# Patient Record
Sex: MALE | Race: WHITE | NOT HISPANIC OR LATINO | Employment: FULL TIME | URBAN - METROPOLITAN AREA
[De-identification: names, ages, dates, MRNs, and addresses within clinical notes are randomized per-mention and may not be internally consistent; named-entity substitution may affect disease eponyms.]

---

## 2020-01-23 ENCOUNTER — TRANSFERRED RECORDS (OUTPATIENT)
Dept: HEALTH INFORMATION MANAGEMENT | Facility: CLINIC | Age: 25
End: 2020-01-23

## 2021-10-20 ENCOUNTER — OFFICE VISIT (OUTPATIENT)
Dept: FAMILY MEDICINE | Facility: CLINIC | Age: 26
End: 2021-10-20
Payer: COMMERCIAL

## 2021-10-20 VITALS
DIASTOLIC BLOOD PRESSURE: 78 MMHG | WEIGHT: 173.13 LBS | BODY MASS INDEX: 24.24 KG/M2 | HEIGHT: 71 IN | SYSTOLIC BLOOD PRESSURE: 137 MMHG | HEART RATE: 51 BPM | TEMPERATURE: 97.2 F

## 2021-10-20 DIAGNOSIS — Z23 ENCOUNTER FOR IMMUNIZATION: ICD-10-CM

## 2021-10-20 DIAGNOSIS — Z00.00 ROUTINE GENERAL MEDICAL EXAMINATION AT A HEALTH CARE FACILITY: Primary | ICD-10-CM

## 2021-10-20 DIAGNOSIS — Z23 NEED FOR PROPHYLACTIC VACCINATION AND INOCULATION AGAINST INFLUENZA: ICD-10-CM

## 2021-10-20 PROCEDURE — 99385 PREV VISIT NEW AGE 18-39: CPT | Mod: 25 | Performed by: FAMILY MEDICINE

## 2021-10-20 PROCEDURE — 90715 TDAP VACCINE 7 YRS/> IM: CPT | Performed by: FAMILY MEDICINE

## 2021-10-20 PROCEDURE — 90471 IMMUNIZATION ADMIN: CPT | Performed by: FAMILY MEDICINE

## 2021-10-20 PROCEDURE — 90686 IIV4 VACC NO PRSV 0.5 ML IM: CPT | Performed by: FAMILY MEDICINE

## 2021-10-20 PROCEDURE — 90472 IMMUNIZATION ADMIN EACH ADD: CPT | Performed by: FAMILY MEDICINE

## 2021-10-20 ASSESSMENT — ENCOUNTER SYMPTOMS
CHILLS: 0
FEVER: 0
PALPITATIONS: 0
EYE PAIN: 0
HEMATURIA: 0
NAUSEA: 0
HEARTBURN: 0
JOINT SWELLING: 0
NERVOUS/ANXIOUS: 0
ABDOMINAL PAIN: 0
HEMATOCHEZIA: 0
DIZZINESS: 0
CONSTIPATION: 0
PARESTHESIAS: 0
MYALGIAS: 0
SORE THROAT: 0
SHORTNESS OF BREATH: 0
DYSURIA: 0
WEAKNESS: 0
ARTHRALGIAS: 0
DIARRHEA: 0
HEADACHES: 0
FREQUENCY: 0
COUGH: 0

## 2021-10-20 ASSESSMENT — PAIN SCALES - GENERAL: PAINLEVEL: NO PAIN (0)

## 2021-10-20 ASSESSMENT — MIFFLIN-ST. JEOR: SCORE: 1801.54

## 2021-10-20 NOTE — PROGRESS NOTES
SUBJECTIVE:   CC: Jarrett Lynch is an 26 year old male who presents for preventative health visit.        Patient has been advised of split billing requirements and indicates understanding: Yes  Healthy Habits:     Getting at least 3 servings of Calcium per day:  Yes    Bi-annual eye exam:  Yes    Dental care twice a year:  Yes    Sleep apnea or symptoms of sleep apnea:  None    Diet:  Regular (no restrictions)    Frequency of exercise:  4-5 days/week    Duration of exercise:  45-60 minutes    Taking medications regularly:  Yes    Medication side effects:  Not applicable    PHQ-2 Total Score: 0    Additional concerns today:  No               Today's PHQ-2 Score:   PHQ-2 ( 1999 Pfizer) 10/20/2021   Q1: Little interest or pleasure in doing things 0   Q2: Feeling down, depressed or hopeless 0   PHQ-2 Score 0   Q1: Little interest or pleasure in doing things Not at all   Q2: Feeling down, depressed or hopeless Not at all   PHQ-2 Score 0       Abuse: Current or Past(Physical, Sexual or Emotional)- No  Do you feel safe in your environment? Yes    Have you ever done Advance Care Planning? (For example, a Health Directive, POLST, or a discussion with a medical provider or your loved ones about your wishes): No, advance care planning information given to patient to review.  Patient declined advance care planning discussion at this time.    Social History     Tobacco Use     Smoking status: Never Smoker     Smokeless tobacco: Never Used   Substance Use Topics     Alcohol use: Yes     If you drink alcohol do you typically have >3 drinks per day or >7 drinks per week? Not applicable    Alcohol Use 10/20/2021   Prescreen: >3 drinks/day or >7 drinks/week? No   Prescreen: >3 drinks/day or >7 drinks/week? -   No flowsheet data found.    Last PSA: No results found for: PSA    Reviewed orders with patient. Reviewed health maintenance and updated orders accordingly - Yes       Reviewed and updated as needed this visit by clinical  "staff  Tobacco  Allergies  Meds   Med Hx  Surg Hx  Fam Hx  Soc Hx        Reviewed and updated as needed this visit by Provider                     Review of Systems   Constitutional: Negative for chills and fever.   HENT: Negative for congestion, ear pain, hearing loss and sore throat.    Eyes: Negative for pain and visual disturbance.   Respiratory: Negative for cough and shortness of breath.    Cardiovascular: Negative for chest pain, palpitations and peripheral edema.   Gastrointestinal: Negative for abdominal pain, constipation, diarrhea, heartburn, hematochezia and nausea.   Genitourinary: Negative for discharge, dysuria, frequency, genital sores, hematuria, impotence and urgency.   Musculoskeletal: Negative for arthralgias, joint swelling and myalgias.   Skin: Negative for rash.   Neurological: Negative for dizziness, weakness, headaches and paresthesias.   Psychiatric/Behavioral: Negative for mood changes. The patient is not nervous/anxious.       medtronic     Rice crejerry  No ongoing health problems      Feeling good    Did marathon    1st one     College baseball    No concerns about stds    Wt stable    No joint pains        OBJECTIVE:   /78 (BP Location: Left arm, Patient Position: Chair, Cuff Size: Adult Regular)   Pulse 51   Temp 97.2  F (36.2  C) (Temporal)   Ht 1.81 m (5' 11.26\")   Wt 78.5 kg (173 lb 2 oz)   BMI 23.97 kg/m      Physical Exam  GENERAL: healthy, alert and no distress  EYES: Eyes grossly normal to inspection, PERRL and conjunctivae and sclerae normal  HENT: ear canals and TM's normal, nose and mouth without ulcers or lesions  NECK: no adenopathy, no asymmetry, masses, or scars and thyroid normal to palpation  RESP: lungs clear to auscultation - no rales, rhonchi or wheezes  CV: regular rate and rhythm, normal S1 S2, no S3 or S4, no murmur, click or rub, no peripheral edema and peripheral pulses strong  ABDOMEN: soft, nontender, no hepatosplenomegaly, no masses and " "bowel sounds normal  MS: no gross musculoskeletal defects noted, no edema  SKIN: no suspicious lesions or rashes  NEURO: Normal strength and tone, mentation intact and speech normal  PSYCH: mentation appears normal, affect normal/bright    Diagnostic Test Results:  Labs reviewed in Epic    ASSESSMENT/PLAN:   Jarrett was seen today for physical, health maintenance and imm/inj.    Diagnoses and all orders for this visit:    Routine general medical examination at a health care facility    Encounter for immunization  -     TDAP VACCINE (Adacel, Boostrix)  [[id number]]  -     SCREENING QUESTIONS FOR ADULT IMMUNIZATIONS    Need for prophylactic vaccination and inoculation against influenza  -     INFLUENZA VACCINE IM > 6 MONTHS VALENT IIV4 (AFLURIA/FLUZONE)    Other orders  -     REVIEW OF HEALTH MAINTENANCE PROTOCOL ORDERS    patient in very good health  He will try to get childhood shot record from mom  Gave tdap and flu  Discussed hpv vaccine  No std concern at this time  Can do physical every 2-3 years  Come in sooner if needed     Patient has been advised of split billing requirements and indicates understanding: Yes  COUNSELING:   Reviewed preventive health counseling, as reflected in patient instructions       Regular exercise       Healthy diet/nutrition       Vision screening       Safe sex practices/STD prevention    Estimated body mass index is 23.97 kg/m  as calculated from the following:    Height as of this encounter: 1.81 m (5' 11.26\").    Weight as of this encounter: 78.5 kg (173 lb 2 oz).         He reports that he has never smoked. He has never used smokeless tobacco.      Counseling Resources:  ATP IV Guidelines  Pooled Cohorts Equation Calculator  FRAX Risk Assessment  ICSI Preventive Guidelines  Dietary Guidelines for Americans, 2010  USDA's MyPlate  ASA Prophylaxis  Lung CA Screening    Farhat Rehman MD  [facility]  "

## 2022-01-06 ENCOUNTER — HOSPITAL ENCOUNTER (OUTPATIENT)
Dept: CT IMAGING | Facility: CLINIC | Age: 27
Discharge: HOME | End: 2022-01-06
Attending: PHYSICIAN ASSISTANT | Admitting: PHYSICIAN ASSISTANT
Payer: COMMERCIAL

## 2022-01-06 ENCOUNTER — OFFICE VISIT (OUTPATIENT)
Dept: PEDIATRICS | Facility: CLINIC | Age: 27
End: 2022-01-06

## 2022-01-06 ENCOUNTER — OFFICE VISIT (OUTPATIENT)
Dept: FAMILY MEDICINE | Facility: CLINIC | Age: 27
End: 2022-01-06

## 2022-01-06 VITALS
BODY MASS INDEX: 24.88 KG/M2 | OXYGEN SATURATION: 98 % | WEIGHT: 179.7 LBS | SYSTOLIC BLOOD PRESSURE: 157 MMHG | DIASTOLIC BLOOD PRESSURE: 75 MMHG | HEART RATE: 49 BPM

## 2022-01-06 VITALS
RESPIRATION RATE: 18 BRPM | DIASTOLIC BLOOD PRESSURE: 75 MMHG | OXYGEN SATURATION: 100 % | TEMPERATURE: 97.4 F | BODY MASS INDEX: 24.78 KG/M2 | WEIGHT: 179 LBS | SYSTOLIC BLOOD PRESSURE: 150 MMHG | HEART RATE: 56 BPM

## 2022-01-06 DIAGNOSIS — R19.00 ABDOMINAL MASS, UNSPECIFIED ABDOMINAL LOCATION: ICD-10-CM

## 2022-01-06 DIAGNOSIS — R19.00 ABDOMINAL MASS, UNSPECIFIED ABDOMINAL LOCATION: Primary | ICD-10-CM

## 2022-01-06 DIAGNOSIS — R74.8 ELEVATED LIVER ENZYMES: ICD-10-CM

## 2022-01-06 DIAGNOSIS — R10.31 ABDOMINAL PAIN, RIGHT LOWER QUADRANT: ICD-10-CM

## 2022-01-06 DIAGNOSIS — R10.31 ABDOMINAL PAIN, RIGHT LOWER QUADRANT: Primary | ICD-10-CM

## 2022-01-06 LAB
ALBUMIN SERPL-MCNC: 4.2 G/DL (ref 3.4–5)
ALBUMIN UR-MCNC: 20 MG/DL
ALP SERPL-CCNC: 61 U/L (ref 40–150)
ALT SERPL W P-5'-P-CCNC: 95 U/L (ref 0–70)
ANION GAP SERPL CALCULATED.3IONS-SCNC: 4 MMOL/L (ref 3–14)
APPEARANCE UR: CLEAR
AST SERPL W P-5'-P-CCNC: 660 U/L (ref 0–45)
BASOPHILS # BLD AUTO: 0.1 10E3/UL (ref 0–0.2)
BASOPHILS NFR BLD AUTO: 1 %
BILIRUB SERPL-MCNC: 0.8 MG/DL (ref 0.2–1.3)
BILIRUB UR QL STRIP: NEGATIVE
BUN SERPL-MCNC: 14 MG/DL (ref 7–30)
CALCIUM SERPL-MCNC: 9 MG/DL (ref 8.5–10.1)
CHLORIDE BLD-SCNC: 107 MMOL/L (ref 94–109)
CO2 SERPL-SCNC: 28 MMOL/L (ref 20–32)
COLOR UR AUTO: ABNORMAL
CREAT SERPL-MCNC: 0.78 MG/DL (ref 0.66–1.25)
EOSINOPHIL # BLD AUTO: 0.1 10E3/UL (ref 0–0.7)
EOSINOPHIL NFR BLD AUTO: 2 %
ERYTHROCYTE [DISTWIDTH] IN BLOOD BY AUTOMATED COUNT: 12.6 % (ref 10–15)
GFR SERPL CREATININE-BSD FRML MDRD: >90 ML/MIN/1.73M2
GLUCOSE BLD-MCNC: 92 MG/DL (ref 70–99)
GLUCOSE UR STRIP-MCNC: NEGATIVE MG/DL
HCT VFR BLD AUTO: 46.4 % (ref 40–53)
HGB BLD-MCNC: 15.4 G/DL (ref 13.3–17.7)
HGB UR QL STRIP: ABNORMAL
IMM GRANULOCYTES # BLD: 0 10E3/UL
IMM GRANULOCYTES NFR BLD: 0 %
KETONES UR STRIP-MCNC: NEGATIVE MG/DL
LEUKOCYTE ESTERASE UR QL STRIP: NEGATIVE
LYMPHOCYTES # BLD AUTO: 1.4 10E3/UL (ref 0.8–5.3)
LYMPHOCYTES NFR BLD AUTO: 17 %
MCH RBC QN AUTO: 31.3 PG (ref 26.5–33)
MCHC RBC AUTO-ENTMCNC: 33.2 G/DL (ref 31.5–36.5)
MCV RBC AUTO: 94 FL (ref 78–100)
MONOCYTES # BLD AUTO: 0.6 10E3/UL (ref 0–1.3)
MONOCYTES NFR BLD AUTO: 7 %
NEUTROPHILS # BLD AUTO: 5.8 10E3/UL (ref 1.6–8.3)
NEUTROPHILS NFR BLD AUTO: 73 %
NITRATE UR QL: NEGATIVE
NRBC # BLD AUTO: 0 10E3/UL
NRBC BLD AUTO-RTO: 0 /100
PH UR STRIP: 6 [PH] (ref 5–7)
PLATELET # BLD AUTO: 275 10E3/UL (ref 150–450)
POTASSIUM BLD-SCNC: 4.1 MMOL/L (ref 3.4–5.3)
PROT SERPL-MCNC: 7.6 G/DL (ref 6.8–8.8)
RBC # BLD AUTO: 4.92 10E6/UL (ref 4.4–5.9)
RBC URINE: 1 /HPF
SODIUM SERPL-SCNC: 139 MMOL/L (ref 133–144)
SP GR UR STRIP: 1.01 (ref 1–1.03)
UROBILINOGEN UR STRIP-MCNC: NORMAL MG/DL
WBC # BLD AUTO: 8 10E3/UL (ref 4–11)
WBC URINE: <1 /HPF

## 2022-01-06 PROCEDURE — 36415 COLL VENOUS BLD VENIPUNCTURE: CPT | Performed by: PHYSICIAN ASSISTANT

## 2022-01-06 PROCEDURE — 250N000011 HC RX IP 250 OP 636: Performed by: PHYSICIAN ASSISTANT

## 2022-01-06 PROCEDURE — 99215 OFFICE O/P EST HI 40 MIN: CPT | Performed by: PHYSICIAN ASSISTANT

## 2022-01-06 PROCEDURE — 99207 REFERRAL TO ACUTE AND DIAGNOSTIC SERVICES: CPT

## 2022-01-06 PROCEDURE — 85025 COMPLETE CBC W/AUTO DIFF WBC: CPT | Performed by: PHYSICIAN ASSISTANT

## 2022-01-06 PROCEDURE — 250N000009 HC RX 250: Performed by: PHYSICIAN ASSISTANT

## 2022-01-06 PROCEDURE — 81001 URINALYSIS AUTO W/SCOPE: CPT | Performed by: PHYSICIAN ASSISTANT

## 2022-01-06 PROCEDURE — 99417 PROLNG OP E/M EACH 15 MIN: CPT | Performed by: PHYSICIAN ASSISTANT

## 2022-01-06 PROCEDURE — 74177 CT ABD & PELVIS W/CONTRAST: CPT

## 2022-01-06 PROCEDURE — 80053 COMPREHEN METABOLIC PANEL: CPT | Performed by: PHYSICIAN ASSISTANT

## 2022-01-06 RX ORDER — OMEPRAZOLE 40 MG/1
40 CAPSULE, DELAYED RELEASE ORAL DAILY
Qty: 30 CAPSULE | Refills: 0 | Status: SHIPPED | OUTPATIENT
Start: 2022-01-06 | End: 2022-09-16

## 2022-01-06 RX ORDER — IOPAMIDOL 755 MG/ML
500 INJECTION, SOLUTION INTRAVASCULAR ONCE
Status: COMPLETED | OUTPATIENT
Start: 2022-01-06 | End: 2022-01-06

## 2022-01-06 RX ADMIN — SODIUM CHLORIDE 62 ML: 9 INJECTION, SOLUTION INTRAVENOUS at 14:43

## 2022-01-06 RX ADMIN — IOPAMIDOL 90 ML: 755 INJECTION, SOLUTION INTRAVENOUS at 14:43

## 2022-01-06 NOTE — PATIENT INSTRUCTIONS
Go straight to the [facility]. Do not eat or drink anything for now.   You will be seeing Mason at the [facility] for rule out hernia

## 2022-01-06 NOTE — PROGRESS NOTES
Assessment & Plan     Abdominal mass, unspecified abdominal location  Was referred to the [facility] in [place] for further evaluation for possible hernia.  Patient accepted by the [facility] rule out incarcerated hernia    - Referral to Acute and Diagnostic Services (Day of diagnostic / First order acute); Future    15 minutes spent on the date of the encounter doing chart review, patient visit and documentation        See Patient Instructions    Return for Follow up at the [facility]  now.    [facility]  [facility]    Subjective   Jarrett is a 26 year old who presents for the following health issues     HPI     Jarrett is a 26-year-old male who presents to clinic today with complaints of lower abdominal pain and a small mass on the right side of his lower abdomen after a strenuous abdominal workout 3 days ago.  States he expected some soreness after doing this workout, however each day pain is intensified and swelling in the right side of his lower abdomen has become larger.  Denies any history of hernias.  Has not noticed any swelling in the groin.  No problems passing his urine.  Did have some issues passing bowel movement today and feels bloated.  No nausea or vomiting.  Has not tried anything for his discomfort.      Review of Systems   CONSTITUTIONAL:NEGATIVE for fever, chills, change in weight  RESP:NEGATIVE for significant cough or SOB  CV: NEGATIVE for chest pain, palpitations or peripheral edema  GI: POSITIVE for abdominal pain periumbilical and gas or bloating and NEGATIVE for diarrhea, hematochezia, nausea and vomiting  : negative for dysuria, hematuria, decreased urinary stream.      Objective    BP (!) 157/75 (BP Location: Right arm, Patient Position: Sitting, Cuff Size: Adult Regular)   Pulse (!) 49   Wt 81.5 kg (179 lb 11.2 oz)   SpO2 98%   BMI 24.88 kg/m    Body mass index is 24.88 kg/m .  Physical Exam  Constitutional:        Appearance: Normal appearance.   Cardiovascular:      Rate and Rhythm: Normal rate and regular rhythm.      Heart sounds: Normal heart sounds. No murmur heard.      Pulmonary:      Effort: Pulmonary effort is normal. No respiratory distress.      Breath sounds: Normal breath sounds.   Abdominal:      General: Bowel sounds are normal.      Palpations: There is mass.      Tenderness: There is abdominal tenderness. There is no right CVA tenderness, left CVA tenderness, guarding or rebound.      Hernia: There is no hernia in the right femoral area or right inguinal area.          Comments: Firm mass with tenderness tenderness to palpation.    Skin:     General: Skin is warm and dry.   Neurological:      Mental Status: He is alert and oriented to person, place, and time.   Psychiatric:         Thought Content: Thought content normal.

## 2022-01-06 NOTE — PROGRESS NOTES
Assessment & Plan     Abdominal discomfort, unspecified abdominal location  CBC neg for elevated WBC  UA normal  Start omeprazole  Avoid tylenol and alchohol  - IV access  - sodium chloride (PF) 0.9% PF flush 3 mL  - Referral to Acute and Diagnostic Services (Day of diagnostic / First order acute)  - CBC with platelets and differential; Future  - Comprehensive metabolic panel (BMP + Alb, Alk Phos, ALT, AST, Total. Bili, TP)  - UA with Microscopic reflex to Culture  - CBC with platelets and differential  - omeprazole (PRILOSEC) 40 MG DR capsule; Take 1 capsule (40 mg) by mouth daily    Abdominal pain, right lower quadrant  CT shows no source of infection  CBC neg for infection  CMP shows elevated AST  - IV access  - sodium chloride (PF) 0.9% PF flush 3 mL  - CBC with platelets and differential; Future  - Comprehensive metabolic panel (BMP + Alb, Alk Phos, ALT, AST, Total. Bili, TP)  - UA with Microscopic reflex to Culture  - CBC with platelets and differential  - omeprazole (PRILOSEC) 40 MG DR capsule; Take 1 capsule (40 mg) by mouth daily    Elevated liver enzymes  Results for orders placed or performed during the hospital encounter of 01/06/22   CT Abdomen Pelvis w Contrast     Status: None    Narrative    CT ABDOMEN AND PELVIS WITH CONTRAST 1/6/2022 2:56 PM    CLINICAL HISTORY: Right lower quadrant pain.    TECHNIQUE: CT scan of the abdomen and pelvis was performed following  injection of IV contrast. Multiplanar reformats were obtained. Dose  reduction techniques were used.  CONTRAST: 90 mL Isovue-370    COMPARISON: None.    FINDINGS:   LOWER CHEST: The visualized lung bases are clear.    HEPATOBILIARY: Unremarkable. No hepatic masses are seen.    PANCREAS: Normal.    SPLEEN: Normal.    ADRENAL GLANDS: Normal.    KIDNEYS/BLADDER: Unremarkable. No hydronephrosis.    BOWEL: Evaluation of the bowel is limited by paucity of  intra-abdominal fat. No bowel obstruction. No convincing evidence for  colitis or  diverticulitis. The appendix is not visualized; however,  there is no significant inflammatory change in the region of the  appendix to suggest appendicitis.    PELVIC ORGANS: Small amount of nonspecific free fluid in the pelvis.    LYMPH NODES: No enlarged lymph nodes are identified in the abdomen or  pelvis.    VASCULATURE: Unremarkable.    ADDITIONAL FINDINGS: None.    MUSCULOSKELETAL: Unremarkable.      Impression    IMPRESSION:   1.  No acute abnormality in the abdomen or pelvis. No cause for right  lower quadrant pain is identified.  2.  Small amount of nonspecific free fluid in the pelvis.    ANTHONY JACQUES MD         SYSTEM ID:  [id number]   Results for orders placed or performed in visit on 01/06/22   Comprehensive metabolic panel (BMP + Alb, Alk Phos, ALT, AST, Total. Bili, TP)     Status: Abnormal   Result Value Ref Range    Sodium 139 133 - 144 mmol/L    Potassium 4.1 3.4 - 5.3 mmol/L    Chloride 107 94 - 109 mmol/L    Carbon Dioxide (CO2) 28 20 - 32 mmol/L    Anion Gap 4 3 - 14 mmol/L    Urea Nitrogen 14 7 - 30 mg/dL    Creatinine 0.78 0.66 - 1.25 mg/dL    Calcium 9.0 8.5 - 10.1 mg/dL    Glucose 92 70 - 99 mg/dL    Alkaline Phosphatase 61 40 - 150 U/L     (HH) 0 - 45 U/L    ALT 95 (H) 0 - 70 U/L    Protein Total 7.6 6.8 - 8.8 g/dL    Albumin 4.2 3.4 - 5.0 g/dL    Bilirubin Total 0.8 0.2 - 1.3 mg/dL    GFR Estimate >90 >60 mL/min/1.73m2   UA with Microscopic reflex to Culture     Status: Abnormal    Specimen: Urine, Clean Catch   Result Value Ref Range    Color Urine Straw Colorless, Straw, Light Yellow, Yellow    Appearance Urine Clear Clear    Glucose Urine Negative Negative mg/dL    Bilirubin Urine Negative Negative    Ketones Urine Negative Negative mg/dL    Specific Gravity Urine 1.012 1.003 - 1.035    Blood Urine Large (A) Negative    pH Urine 6.0 5.0 - 7.0    Protein Albumin Urine 20  (A) Negative mg/dL    Urobilinogen Urine Normal Normal, 2.0 mg/dL    Nitrite Urine Negative Negative     Leukocyte Esterase Urine Negative Negative    RBC Urine 1 <=2 /HPF    WBC Urine <1 <=5 /HPF    Narrative    Urine Culture not indicated   CBC with platelets and differential     Status: None   Result Value Ref Range    WBC Count 8.0 4.0 - 11.0 10e3/uL    RBC Count 4.92 4.40 - 5.90 10e6/uL    Hemoglobin 15.4 13.3 - 17.7 g/dL    Hematocrit 46.4 40.0 - 53.0 %    MCV 94 78 - 100 fL    MCH 31.3 26.5 - 33.0 pg    MCHC 33.2 31.5 - 36.5 g/dL    RDW 12.6 10.0 - 15.0 %    Platelet Count 275 150 - 450 10e3/uL    % Neutrophils 73 %    % Lymphocytes 17 %    % Monocytes 7 %    % Eosinophils 2 %    % Basophils 1 %    % Immature Granulocytes 0 %    NRBCs per 100 WBC 0 <1 /100    Absolute Neutrophils 5.8 1.6 - 8.3 10e3/uL    Absolute Lymphocytes 1.4 0.8 - 5.3 10e3/uL    Absolute Monocytes 0.6 0.0 - 1.3 10e3/uL    Absolute Eosinophils 0.1 0.0 - 0.7 10e3/uL    Absolute Basophils 0.1 0.0 - 0.2 10e3/uL    Absolute Immature Granulocytes 0.0 <=0.4 10e3/uL    Absolute NRBCs 0.0 10e3/uL   CBC with platelets and differential     Status: None    Narrative    The following orders were created for panel order CBC with platelets and differential.  Procedure                               Abnormality         Status                     ---------                               -----------         ------                     CBC with platelets and d...[[id number]]                      Final result                 Please view results for these tests on the individual orders.     Avoid ETOH and tylenol  [place] diet  Reduce working out, avoid supplements  Recheck liver enzymes in 1 week with PCP  Go to the ED if symptoms worsen       Review of external notes as documented elsewhere in note  90 minutes spent on the date of the encounter doing chart review, review of outside records, review of test results, interpretation of tests, patient visit and documentation        CONSULTATION/REFERRAL to PCP for recheck of liver rosaline    No follow-ups on  file.    EMMANUEL Luna [facility] MALLORY Farias is a 26 year old who presents for the following health issues   HPI     Abdominal/Flank Pain  Onset/Duration: X 2 days  Description:   Character: Sharp and Burning  Location: right lower quadrant  Radiation: None  Intensity: 6/10  Progression of Symptoms:  worsening  Accompanying Signs & Symptoms:  Fever/Chills: no   Gas/Bloating: YES- bloating  Nausea: no   Vomitting: no   Diarrhea: YES- minor this AM  Constipation: YES  Dysuria or Hematuria: no   History:   Trauma: no   Previous similar pain: no   Previous tests done: no   Previous Abdominal Surgery: no   Precipitating factors:   Does the pain change with:     Food: no     Bowel Movement: no     Urination: no    Other factors:  YES- sitting long periods and then standing, pain is increased vs. Sitting.  Therapies tried and outcome: None        Review of Systems   Constitutional, HEENT, cardiovascular, pulmonary, gi and gu systems are negative, except as otherwise noted.      Objective    Wt 81.2 kg (179 lb)   BMI 24.78 kg/m    Body mass index is 24.78 kg/m .  Physical Exam   GENERAL: healthy, alert and no distress  HENT: ear canals and TM's normal, nose and mouth without ulcers or lesions  NECK: no adenopathy, no asymmetry, masses, or scars and thyroid normal to palpation  RESP: lungs clear to auscultation - no rales, rhonchi or wheezes  CV: regular rate and rhythm, normal S1 S2, no S3 or S4, no murmur, click or rub, no peripheral edema and peripheral pulses strong  ABDOMEN: Positive for mild tenderness right side abdomen, no guarding or rebound.  Negative for RUQ abdominal   MS: no gross musculoskeletal defects noted, no edema  SKIN: no suspicious lesions or rashes  NEURO: Normal strength and tone, mentation intact and speech normal    Results for orders placed or performed during the hospital encounter of 01/06/22   CT Abdomen Pelvis w Contrast     Status: None     Narrative    CT ABDOMEN AND PELVIS WITH CONTRAST 1/6/2022 2:56 PM    CLINICAL HISTORY: Right lower quadrant pain.    TECHNIQUE: CT scan of the abdomen and pelvis was performed following  injection of IV contrast. Multiplanar reformats were obtained. Dose  reduction techniques were used.  CONTRAST: 90 mL Isovue-370    COMPARISON: None.    FINDINGS:   LOWER CHEST: The visualized lung bases are clear.    HEPATOBILIARY: Unremarkable. No hepatic masses are seen.    PANCREAS: Normal.    SPLEEN: Normal.    ADRENAL GLANDS: Normal.    KIDNEYS/BLADDER: Unremarkable. No hydronephrosis.    BOWEL: Evaluation of the bowel is limited by paucity of  intra-abdominal fat. No bowel obstruction. No convincing evidence for  colitis or diverticulitis. The appendix is not visualized; however,  there is no significant inflammatory change in the region of the  appendix to suggest appendicitis.    PELVIC ORGANS: Small amount of nonspecific free fluid in the pelvis.    LYMPH NODES: No enlarged lymph nodes are identified in the abdomen or  pelvis.    VASCULATURE: Unremarkable.    ADDITIONAL FINDINGS: None.    MUSCULOSKELETAL: Unremarkable.      Impression    IMPRESSION:   1.  No acute abnormality in the abdomen or pelvis. No cause for right  lower quadrant pain is identified.  2.  Small amount of nonspecific free fluid in the pelvis.    ANTHONY JACQUES MD         SYSTEM ID:  [id number]   Results for orders placed or performed in visit on 01/06/22   Comprehensive metabolic panel (BMP + Alb, Alk Phos, ALT, AST, Total. Bili, TP)     Status: Abnormal   Result Value Ref Range    Sodium 139 133 - 144 mmol/L    Potassium 4.1 3.4 - 5.3 mmol/L    Chloride 107 94 - 109 mmol/L    Carbon Dioxide (CO2) 28 20 - 32 mmol/L    Anion Gap 4 3 - 14 mmol/L    Urea Nitrogen 14 7 - 30 mg/dL    Creatinine 0.78 0.66 - 1.25 mg/dL    Calcium 9.0 8.5 - 10.1 mg/dL    Glucose 92 70 - 99 mg/dL    Alkaline Phosphatase 61 40 - 150 U/L     (HH) 0 - 45 U/L    ALT 95 (H) 0  - 70 U/L    Protein Total 7.6 6.8 - 8.8 g/dL    Albumin 4.2 3.4 - 5.0 g/dL    Bilirubin Total 0.8 0.2 - 1.3 mg/dL    GFR Estimate >90 >60 mL/min/1.73m2   UA with Microscopic reflex to Culture     Status: Abnormal    Specimen: Urine, Clean Catch   Result Value Ref Range    Color Urine Straw Colorless, Straw, Light Yellow, Yellow    Appearance Urine Clear Clear    Glucose Urine Negative Negative mg/dL    Bilirubin Urine Negative Negative    Ketones Urine Negative Negative mg/dL    Specific Gravity Urine 1.012 1.003 - 1.035    Blood Urine Large (A) Negative    pH Urine 6.0 5.0 - 7.0    Protein Albumin Urine 20  (A) Negative mg/dL    Urobilinogen Urine Normal Normal, 2.0 mg/dL    Nitrite Urine Negative Negative    Leukocyte Esterase Urine Negative Negative    RBC Urine 1 <=2 /HPF    WBC Urine <1 <=5 /HPF    Narrative    Urine Culture not indicated   CBC with platelets and differential     Status: None   Result Value Ref Range    WBC Count 8.0 4.0 - 11.0 10e3/uL    RBC Count 4.92 4.40 - 5.90 10e6/uL    Hemoglobin 15.4 13.3 - 17.7 g/dL    Hematocrit 46.4 40.0 - 53.0 %    MCV 94 78 - 100 fL    MCH 31.3 26.5 - 33.0 pg    MCHC 33.2 31.5 - 36.5 g/dL    RDW 12.6 10.0 - 15.0 %    Platelet Count 275 150 - 450 10e3/uL    % Neutrophils 73 %    % Lymphocytes 17 %    % Monocytes 7 %    % Eosinophils 2 %    % Basophils 1 %    % Immature Granulocytes 0 %    NRBCs per 100 WBC 0 <1 /100    Absolute Neutrophils 5.8 1.6 - 8.3 10e3/uL    Absolute Lymphocytes 1.4 0.8 - 5.3 10e3/uL    Absolute Monocytes 0.6 0.0 - 1.3 10e3/uL    Absolute Eosinophils 0.1 0.0 - 0.7 10e3/uL    Absolute Basophils 0.1 0.0 - 0.2 10e3/uL    Absolute Immature Granulocytes 0.0 <=0.4 10e3/uL    Absolute NRBCs 0.0 10e3/uL   CBC with platelets and differential     Status: None    Narrative    The following orders were created for panel order CBC with platelets and differential.  Procedure                               Abnormality         Status                      ---------                               -----------         ------                     CBC with platelets and d...[[id number]]                      Final result                 Please view results for these tests on the individual orders.

## 2022-01-13 ENCOUNTER — OFFICE VISIT (OUTPATIENT)
Dept: FAMILY MEDICINE | Facility: CLINIC | Age: 27
End: 2022-01-13
Payer: COMMERCIAL

## 2022-01-13 VITALS
HEIGHT: 71 IN | WEIGHT: 181.38 LBS | HEART RATE: 68 BPM | BODY MASS INDEX: 25.39 KG/M2 | SYSTOLIC BLOOD PRESSURE: 123 MMHG | DIASTOLIC BLOOD PRESSURE: 58 MMHG | TEMPERATURE: 98 F

## 2022-01-13 DIAGNOSIS — Z11.59 NEED FOR HEPATITIS C SCREENING TEST: ICD-10-CM

## 2022-01-13 DIAGNOSIS — R79.89 ELEVATED LFTS: Primary | ICD-10-CM

## 2022-01-13 LAB
ALBUMIN SERPL-MCNC: 3.9 G/DL (ref 3.4–5)
ALP SERPL-CCNC: 53 U/L (ref 40–150)
ALT SERPL W P-5'-P-CCNC: 81 U/L (ref 0–70)
AST SERPL W P-5'-P-CCNC: 57 U/L (ref 0–45)
BILIRUB DIRECT SERPL-MCNC: 0.1 MG/DL (ref 0–0.2)
BILIRUB SERPL-MCNC: 0.4 MG/DL (ref 0.2–1.3)
HAV IGG SER QL IA: NONREACTIVE
HBV SURFACE AG SERPL QL IA: NONREACTIVE
HCV AB SERPL QL IA: NONREACTIVE
PROT SERPL-MCNC: 7 G/DL (ref 6.8–8.8)

## 2022-01-13 PROCEDURE — 86803 HEPATITIS C AB TEST: CPT | Performed by: FAMILY MEDICINE

## 2022-01-13 PROCEDURE — 87340 HEPATITIS B SURFACE AG IA: CPT | Performed by: FAMILY MEDICINE

## 2022-01-13 PROCEDURE — 80076 HEPATIC FUNCTION PANEL: CPT | Performed by: FAMILY MEDICINE

## 2022-01-13 PROCEDURE — 99213 OFFICE O/P EST LOW 20 MIN: CPT | Performed by: FAMILY MEDICINE

## 2022-01-13 PROCEDURE — 36415 COLL VENOUS BLD VENIPUNCTURE: CPT | Performed by: FAMILY MEDICINE

## 2022-01-13 PROCEDURE — 86708 HEPATITIS A ANTIBODY: CPT | Performed by: FAMILY MEDICINE

## 2022-01-13 ASSESSMENT — MIFFLIN-ST. JEOR: SCORE: 1838.96

## 2022-01-13 ASSESSMENT — PAIN SCALES - GENERAL: PAINLEVEL: NO PAIN (0)

## 2022-01-13 NOTE — PATIENT INSTRUCTIONS
Avoid etoh for now    Stay well hydrated    Finish out omeprazole    After that, could use famotidine ( pepcid ) as needed     We will send you lab results    Be seen promptly if symptoms acutely worsen

## 2022-01-13 NOTE — PROGRESS NOTES
"Kel Farias is a 26 year old who presents for the following health issues     HPI     ED/UC Followup:    Facility:  [facility]  Date of visit: 01/06/2021  Reason for visit: abdominal pain  Current Status: stable but needs to follow up on elevated liver functions            Review of Systems    [facility] in [place]    Rare etoh    None the week or two prior to visit/ labs     Urine looks normal to patient    Sore from wt lifting    No tylenol    Some vomiting recently    Had neg covid test              Objective    /58 (BP Location: Left arm, Patient Position: Chair, Cuff Size: Adult Regular)   Pulse 68   Temp 98  F (36.7  C) (Temporal)   Ht 1.81 m (5' 11.26\")   Wt 82.3 kg (181 lb 6 oz)   BMI 25.11 kg/m    Body mass index is 25.11 kg/m .  Physical Exam  Constitutional:       Appearance: He is well-developed.   HENT:      Head: Normocephalic and atraumatic.   Eyes:      Conjunctiva/sclera: Conjunctivae normal.   Neck:      Vascular: No carotid bruit.   Cardiovascular:      Rate and Rhythm: Normal rate and regular rhythm.      Heart sounds: Normal heart sounds.   Pulmonary:      Effort: Pulmonary effort is normal. No respiratory distress.      Breath sounds: Normal breath sounds.   Abdominal:      General: There is no distension.      Palpations: Abdomen is soft.      Tenderness: There is no abdominal tenderness. There is no right CVA tenderness or left CVA tenderness.   Neurological:      Mental Status: He is alert and oriented to person, place, and time.      Cranial Nerves: No cranial nerve deficit.   Psychiatric:         Speech: Speech normal.         Behavior: Behavior normal.         ASSESSMENT / PLAN:  (U91.95) Elevated LFTs  (primary encounter diagnosis)  Comment: exam here reassuring.  Do follow up liver function test and also check for hepatitis.   Plan: Hepatic panel (Albumin, ALT, AST, Bili, Alk         Phos, TP), Hepatitis Antibody A IgG, Hepatitis "         B surface antigen             (Z11.59) Need for hepatitis C screening test  Comment: as above  Plan: Hepatitis C Screen Reflex to HCV RNA Quant and         Genotype           Follow up as needed based on symptoms     Be seen promptly if symptoms acutely worsen     I reviewed the patient's medications, allergies, medical history, family history, and social history.    Farhat Rehman MD

## 2022-01-14 DIAGNOSIS — R79.89 ELEVATED LFTS: Primary | ICD-10-CM

## 2022-01-14 NOTE — RESULT ENCOUNTER NOTE
The liver tests are much better.  Almost to normal.  Advise doing one more lab test.  In a bout a month do a lab only appointment to get the liver function tests done one more time.  No alcohol for two weeks prior.    No evidence of hepatitis A, B, or C.    Farhat Rehman MD

## 2022-09-12 ENCOUNTER — HOSPITAL ENCOUNTER (EMERGENCY)
Facility: CLINIC | Age: 27
Discharge: HOME | End: 2022-09-12
Attending: EMERGENCY MEDICINE | Admitting: EMERGENCY MEDICINE
Payer: COMMERCIAL

## 2022-09-12 ENCOUNTER — APPOINTMENT (OUTPATIENT)
Dept: CARDIOLOGY | Facility: CLINIC | Age: 27
End: 2022-09-12
Attending: EMERGENCY MEDICINE
Payer: COMMERCIAL

## 2022-09-12 VITALS
RESPIRATION RATE: 15 BRPM | HEART RATE: 40 BPM | DIASTOLIC BLOOD PRESSURE: 66 MMHG | OXYGEN SATURATION: 100 % | SYSTOLIC BLOOD PRESSURE: 124 MMHG | TEMPERATURE: 97.6 F

## 2022-09-12 DIAGNOSIS — Z83.49 FAMILY HISTORY OF THYROID DISEASE: ICD-10-CM

## 2022-09-12 DIAGNOSIS — R55 PRE-SYNCOPE: ICD-10-CM

## 2022-09-12 DIAGNOSIS — R53.83 OTHER FATIGUE: ICD-10-CM

## 2022-09-12 LAB
ALBUMIN SERPL BCG-MCNC: 4.1 G/DL (ref 3.5–5.2)
ALP SERPL-CCNC: 62 U/L (ref 40–129)
ALT SERPL W P-5'-P-CCNC: 18 U/L (ref 10–50)
ANION GAP SERPL CALCULATED.3IONS-SCNC: 7 MMOL/L (ref 7–15)
AST SERPL W P-5'-P-CCNC: 20 U/L (ref 10–50)
ATRIAL RATE - MUSE: 55 BPM
BASOPHILS # BLD AUTO: 0 10E3/UL (ref 0–0.2)
BASOPHILS NFR BLD AUTO: 1 %
BILIRUB SERPL-MCNC: 0.4 MG/DL
BUN SERPL-MCNC: 21.3 MG/DL (ref 6–20)
CALCIUM SERPL-MCNC: 9.1 MG/DL (ref 8.6–10)
CHLORIDE SERPL-SCNC: 105 MMOL/L (ref 98–107)
CREAT SERPL-MCNC: 0.98 MG/DL (ref 0.67–1.17)
D DIMER PPP FEU-MCNC: <0.27 UG/ML FEU (ref 0–0.5)
DEPRECATED HCO3 PLAS-SCNC: 27 MMOL/L (ref 22–29)
DIASTOLIC BLOOD PRESSURE - MUSE: NORMAL MMHG
EOSINOPHIL # BLD AUTO: 0.1 10E3/UL (ref 0–0.7)
EOSINOPHIL NFR BLD AUTO: 2 %
ERYTHROCYTE [DISTWIDTH] IN BLOOD BY AUTOMATED COUNT: 12.8 % (ref 10–15)
GFR SERPL CREATININE-BSD FRML MDRD: >90 ML/MIN/1.73M2
GLUCOSE SERPL-MCNC: 107 MG/DL (ref 70–99)
HCT VFR BLD AUTO: 39.4 % (ref 40–53)
HGB BLD-MCNC: 13.8 G/DL (ref 13.3–17.7)
HOLD SPECIMEN: NORMAL
IMM GRANULOCYTES # BLD: 0 10E3/UL
IMM GRANULOCYTES NFR BLD: 0 %
INTERPRETATION ECG - MUSE: NORMAL
LYMPHOCYTES # BLD AUTO: 1 10E3/UL (ref 0.8–5.3)
LYMPHOCYTES NFR BLD AUTO: 17 %
MAGNESIUM SERPL-MCNC: 1.9 MG/DL (ref 1.7–2.3)
MCH RBC QN AUTO: 31.6 PG (ref 26.5–33)
MCHC RBC AUTO-ENTMCNC: 35 G/DL (ref 31.5–36.5)
MCV RBC AUTO: 90 FL (ref 78–100)
MONOCYTES # BLD AUTO: 0.4 10E3/UL (ref 0–1.3)
MONOCYTES NFR BLD AUTO: 7 %
NEUTROPHILS # BLD AUTO: 4.4 10E3/UL (ref 1.6–8.3)
NEUTROPHILS NFR BLD AUTO: 73 %
NRBC # BLD AUTO: 0 10E3/UL
NRBC BLD AUTO-RTO: 0 /100
P AXIS - MUSE: 78 DEGREES
PLATELET # BLD AUTO: 210 10E3/UL (ref 150–450)
POTASSIUM SERPL-SCNC: 4.6 MMOL/L (ref 3.4–5.3)
PR INTERVAL - MUSE: 194 MS
PROT SERPL-MCNC: 6.8 G/DL (ref 6.4–8.3)
QRS DURATION - MUSE: 94 MS
QT - MUSE: 456 MS
QTC - MUSE: 436 MS
R AXIS - MUSE: 68 DEGREES
RBC # BLD AUTO: 4.37 10E6/UL (ref 4.4–5.9)
SODIUM SERPL-SCNC: 139 MMOL/L (ref 136–145)
SYSTOLIC BLOOD PRESSURE - MUSE: NORMAL MMHG
T AXIS - MUSE: 15 DEGREES
TROPONIN T SERPL HS-MCNC: <6 NG/L
VENTRICULAR RATE- MUSE: 55 BPM
WBC # BLD AUTO: 6 10E3/UL (ref 4–11)

## 2022-09-12 PROCEDURE — 93244 EXT ECG>48HR<7D REV&INTERPJ: CPT | Performed by: INTERNAL MEDICINE

## 2022-09-12 PROCEDURE — 93005 ELECTROCARDIOGRAM TRACING: CPT | Mod: XU | Performed by: EMERGENCY MEDICINE

## 2022-09-12 PROCEDURE — 80053 COMPREHEN METABOLIC PANEL: CPT | Performed by: EMERGENCY MEDICINE

## 2022-09-12 PROCEDURE — 36415 COLL VENOUS BLD VENIPUNCTURE: CPT | Performed by: EMERGENCY MEDICINE

## 2022-09-12 PROCEDURE — 93242 EXT ECG>48HR<7D RECORDING: CPT

## 2022-09-12 PROCEDURE — 84484 ASSAY OF TROPONIN QUANT: CPT | Performed by: EMERGENCY MEDICINE

## 2022-09-12 PROCEDURE — 85379 FIBRIN DEGRADATION QUANT: CPT | Performed by: EMERGENCY MEDICINE

## 2022-09-12 PROCEDURE — 83735 ASSAY OF MAGNESIUM: CPT | Performed by: EMERGENCY MEDICINE

## 2022-09-12 PROCEDURE — 84443 ASSAY THYROID STIM HORMONE: CPT

## 2022-09-12 PROCEDURE — 99284 EMERGENCY DEPT VISIT MOD MDM: CPT | Performed by: EMERGENCY MEDICINE

## 2022-09-12 PROCEDURE — 84439 ASSAY OF FREE THYROXINE: CPT

## 2022-09-12 PROCEDURE — 93010 ELECTROCARDIOGRAM REPORT: CPT | Performed by: EMERGENCY MEDICINE

## 2022-09-12 PROCEDURE — 99284 EMERGENCY DEPT VISIT MOD MDM: CPT | Mod: 25 | Performed by: EMERGENCY MEDICINE

## 2022-09-12 PROCEDURE — 82040 ASSAY OF SERUM ALBUMIN: CPT | Performed by: EMERGENCY MEDICINE

## 2022-09-12 PROCEDURE — 85018 HEMOGLOBIN: CPT | Performed by: EMERGENCY MEDICINE

## 2022-09-12 ASSESSMENT — ACTIVITIES OF DAILY LIVING (ADL)
ADLS_ACUITY_SCORE: 35

## 2022-09-12 NOTE — ED PROVIDER NOTES
[place] EMERGENCY DEPARTMENT ([facility])  9/12/22  History   No chief complaint on file.    The history is provided by the patient and medical records.     Jarrett Lynch is a generally healthy 27 year old male who presents to the ED via EMS for evaluation of lightheadedness and vision change.  Patient underwent his normal morning routine while at work earlier today suddenly felt like he had to cough but could not.  He then became lightheaded and sat down to avoid falling over.  He notes his vision and blurry and he began sweating.  He states he felt this way for approximately 20 minutes.  EMS was called and recorded a heart rate of 36.  Here in the ED, patient notes feeling less focused but otherwise states his symptoms have mostly resolved.  Patient reports commonly running distances up to 10 miles that his heart rate is typically low, but never as low as 36.  Patient reports running 12 miles on Saturday but denies feeling any different during the run or during his recovery.  Patient notes occasionally feeling lightheaded but states it has never been this intense or this long.  Patient denies recent illnesses.  He denies a history of familial cardiac disease or frequent fainting.  Patient denies long periods of immobility with the exception of driving to and from [place] travel last weekend. No other symptoms noted.      I have reviewed the Medications, Allergies, Past Medical and Surgical History, and Social History in the [employer] system.  PAST MEDICAL HISTORY: History reviewed. No pertinent past medical history.    PAST SURGICAL HISTORY: History reviewed. No pertinent surgical history.    Past medical history, past surgical history, medications, and allergies were reviewed with the patient. Additional pertinent items: None    FAMILY HISTORY: History reviewed. No pertinent family history.    SOCIAL HISTORY:   Social History     Tobacco Use     Smoking status: Never Smoker     Smokeless tobacco:  Never Used   Substance Use Topics     Alcohol use: Yes     Comment: rare     Social history was reviewed with the patient. Additional pertinent items: None      Patient's Medications   New Prescriptions    No medications on file   Previous Medications    OMEPRAZOLE (PRILOSEC) 40 MG DR CAPSULE    Take 1 capsule (40 mg) by mouth daily   Modified Medications    No medications on file   Discontinued Medications    No medications on file        No Known Allergies     Review of Systems     A complete review of systems was performed with pertinent positives and negatives noted in the HPI, and all other systems negative.    Physical Exam   BP: 129/88  Pulse: 71  Temp: 97.6  F (36.4  C)  Resp: 20  SpO2: 100 %      Physical Exam  Vitals and nursing note reviewed.   Constitutional:       General: He is not in acute distress.     Appearance: He is well-developed. He is not diaphoretic.   HENT:      Head: Normocephalic and atraumatic.      Mouth/Throat:      Pharynx: No oropharyngeal exudate.   Eyes:      General: No scleral icterus.        Right eye: No discharge.         Left eye: No discharge.      Pupils: Pupils are equal, round, and reactive to light.   Cardiovascular:      Rate and Rhythm: Normal rate and regular rhythm.      Heart sounds: Normal heart sounds. No murmur heard.    No friction rub. No gallop.   Pulmonary:      Effort: Pulmonary effort is normal. No respiratory distress.      Breath sounds: Normal breath sounds. No wheezing.   Chest:      Chest wall: No tenderness.   Abdominal:      General: Bowel sounds are normal. There is no distension.      Palpations: Abdomen is soft.      Tenderness: There is no abdominal tenderness.   Musculoskeletal:         General: No tenderness or deformity. Normal range of motion.      Cervical back: Normal range of motion and neck supple.   Skin:     General: Skin is warm and dry.      Coloration: Skin is not pale.      Findings: No erythema or rash.   Neurological:      Mental  Status: He is alert and oriented to person, place, and time.      Cranial Nerves: No cranial nerve deficit.         ED Course   10:59 AM  The patient was seen and examined by Dr. Agustín Moore in Room ED 14.        Procedures            EKG Interpretation:      Interpreted by Agustín Moore DO  Time reviewed: 1039  Symptoms at time of EKG: None   Rhythm: sinus bradycardia  Rate: 55  Axis: Normal  Ectopy: none  Conduction: normal  ST Segments/ T Waves: Early repolarization  Q Waves: none  Comparison to prior: No old EKG available    Clinical Impression: sinus bradycardia                    Results for orders placed or performed during the hospital encounter of 09/12/22 (from the past 24 hour(s))   [place] Draw    Narrative    The following orders were created for panel order [place] Draw.  Procedure                               Abnormality         Status                     ---------                               -----------         ------                     Extra Blue Top Tube[[id number]]                              In process                 Extra Red Top Tube[[id number]]                               In process                 Extra Green Top (Lithium...[[id number]]                      In process                 Extra Purple Top Tube[[id number]]                            In process                   Please view results for these tests on the individual orders.   CBC with platelets differential    Narrative    The following orders were created for panel order CBC with platelets differential.  Procedure                               Abnormality         Status                     ---------                               -----------         ------                     CBC with platelets and d...[[id number]]  Abnormal            Final result                 Please view results for these tests on the individual orders.   CBC with platelets and differential   Result Value Ref Range    WBC Count 6.0 4.0 - 11.0 10e3/uL     RBC Count 4.37 (L) 4.40 - 5.90 10e6/uL    Hemoglobin 13.8 13.3 - 17.7 g/dL    Hematocrit 39.4 (L) 40.0 - 53.0 %    MCV 90 78 - 100 fL    MCH 31.6 26.5 - 33.0 pg    MCHC 35.0 31.5 - 36.5 g/dL    RDW 12.8 10.0 - 15.0 %    Platelet Count 210 150 - 450 10e3/uL    % Neutrophils 73 %    % Lymphocytes 17 %    % Monocytes 7 %    % Eosinophils 2 %    % Basophils 1 %    % Immature Granulocytes 0 %    NRBCs per 100 WBC 0 <1 /100    Absolute Neutrophils 4.4 1.6 - 8.3 10e3/uL    Absolute Lymphocytes 1.0 0.8 - 5.3 10e3/uL    Absolute Monocytes 0.4 0.0 - 1.3 10e3/uL    Absolute Eosinophils 0.1 0.0 - 0.7 10e3/uL    Absolute Basophils 0.0 0.0 - 0.2 10e3/uL    Absolute Immature Granulocytes 0.0 <=0.4 10e3/uL    Absolute NRBCs 0.0 10e3/uL   EKG 12-lead, tracing only   Result Value Ref Range    Systolic Blood Pressure  mmHg    Diastolic Blood Pressure  mmHg    Ventricular Rate 55 BPM    Atrial Rate 55 BPM    MD Interval 194 ms    QRS Duration 94 ms     ms    QTc 436 ms    P Axis 78 degrees    R AXIS 68 degrees    T Axis 15 degrees    Interpretation ECG       Sinus bradycardia  ST elevation, consider early repolarization  Borderline ECG       Medications - No data to display          Assessments & Plan (with Medical Decision Making)   This is a 27-year-old male who presents with presyncope.  Patient is also noted to be bradycardic.  Symptoms last approximately 20 minutes.  Exam demonstrates no acute abnormalities.  ECG shows sinus bradycardia with a rate of 55, no other acute abnormalities.  Lab work shows no acute abnormalities.  Troponin and D-dimer not elevated.  Patient was monitored in the Emergency Department and states he felt normal.  He was noted to be bradycardic but states that this is baseline for him, he wears a fitness tracking watch.  Bradycardia is likely due to patient being active and athletic.  We will place a Zio patch on the patient.  Discussed that if he has a recurrence of symptoms to return to the Emergency  Department immediately. Will discharge home with return precautions. Discussed reasons to return to the emergency department.  Patient understands and agrees with this plan.    I have reviewed the nursing notes.    I have reviewed the findings, diagnosis, plan and need for follow up with the patient.    New Prescriptions    No medications on file       Final diagnoses:   None     IHollis, am serving as a trained medical scribe to document services personally performed by Agustín Moore DO, based on the provider's statements to me.      Agustín CASTREJON DO, was physically present and have reviewed and verified the accuracy of this note documented by Hollis Rehman.     9/12/2022   [facility] EMERGENCY DEPARTMENT     Agustín Moore DO  09/12/22 1254     yes

## 2022-09-12 NOTE — ED TRIAGE NOTES
Pt BIBA from work after a near syncopal event. Pt states normal activity at work and suddenly felt a feeling like he needed to cough but couldn't, vision became blurry, diaphoretic, felt faint and had to sit down.  HR 30-40 upon EMS arrival    Atropine 1mg and ASA given en route    No PMH         Age appropriate behavior

## 2022-09-12 NOTE — ED NOTES
Bed: ED14  Expected date: 9/12/22  Expected time:   Means of arrival:   Comments:  Esme 628- 27 yr old syncope

## 2022-09-16 ENCOUNTER — VIRTUAL VISIT (OUTPATIENT)
Dept: FAMILY MEDICINE | Facility: CLINIC | Age: 27
End: 2022-09-16
Payer: COMMERCIAL

## 2022-09-16 DIAGNOSIS — R00.1 SINUS BRADYCARDIA: Primary | ICD-10-CM

## 2022-09-16 DIAGNOSIS — Z83.49 FAMILY HISTORY OF THYROID DISEASE: ICD-10-CM

## 2022-09-16 DIAGNOSIS — R53.83 OTHER FATIGUE: ICD-10-CM

## 2022-09-16 LAB
T4 FREE SERPL-MCNC: 1.25 NG/DL (ref 0.9–1.7)
TSH SERPL DL<=0.005 MIU/L-ACNC: 3.61 UIU/ML (ref 0.3–4.2)

## 2022-09-16 PROCEDURE — 99213 OFFICE O/P EST LOW 20 MIN: CPT | Mod: 95 | Performed by: PHYSICIAN ASSISTANT

## 2022-09-16 NOTE — PROGRESS NOTES
"Jarrett is a 27 year old who is being evaluated via a billable video visit.      How would you like to obtain your AVS? MyChart  If the video visit is dropped, the invitation should be resent by: Text to cell phone: [phone]  Will anyone else be joining your video visit? No          Assessment & Plan     Sinus bradycardia  Discussed low heart rate and his current symptoms. Because this has been symptomatic for him, I would like him to see cardiology for discussion. Will await ZIo Patch results. Discussed making sure he stays hydrated, increase electrolyte consumption - especially if returning to longer runs.   - Adult Cardiology Eval  Referral; Future    Other fatigue  Family history of thyroid disease  Rule out thyroid issues.   - TSH; Future  - T4, free; Future               BMI:   Estimated body mass index is 25.11 kg/m  as calculated from the following:    Height as of 1/13/22: 1.81 m (5' 11.26\").    Weight as of 1/13/22: 82.3 kg (181 lb 6 oz).           Return in about 4 weeks (around 10/14/2022) for worsening symptoms or failure to improve..    Marina Judge PA-C  [facility]    Kle Farias is a 27 year old, presenting for the following health issues:  Hospital F/U      History of Present Illness       Reason for visit:  Follow-up from ER visit on 9/12/2022. No conclusive findings from lab results performed for why I had nearly fainted/very low heart rate suddenly. Looking to confirm if it's safe to drive and if any of the lab results rule out a thyroid issue.    He eats 4 or more servings of fruits and vegetables daily.He consumes 0 sweetened beverage(s) daily.He exercises with enough effort to increase his heart rate 30 to 60 minutes per day.  He exercises with enough effort to increase his heart rate 5 days per week.   He is taking medications regularly.       Patient doing well after ED visit on 9/12/2022 to evaluate a pre-syncopal event while at work. He " does report over the last few months he has had intermittent episodes of lightheadedness. This week, when having lightheadedness, his heart rate has been in the 30s. On average - per his [employer] watch - his resting heart rate tends to be in the upper 40s. He has not had a syncopal event.     He is a runner - runs multiple times per week, generally long runs. Since the ED visit, he ran about 3 miles one day without issue. Then proceeded to do a longer run a day or so later, again without issues.   He does not ever have episodes of lightheadedness while running - but sometimes has had some lightheadedness when cooling down as his heart rate decreases.   Reports his dad has thyroid disease. Wonders if his thyroid could contribute. Reports his hands seem cold often - but thinks this may be due to low heart rate.    He is currently wearing the Zio Patch.        Review of Systems   Constitutional, HEENT, cardiovascular, pulmonary, gi and gu systems are negative, except as otherwise noted.      Objective           Vitals:  No vitals were obtained today due to virtual visit.    Physical Exam   GENERAL: Healthy, alert and no distress  EYES: Eyes grossly normal to inspection.  No discharge or erythema, or obvious scleral/conjunctival abnormalities.  RESP: No audible wheeze, cough, or visible cyanosis.  No visible retractions or increased work of breathing.    SKIN: Visible skin clear. No significant rash, abnormal pigmentation or lesions.  NEURO: Cranial nerves grossly intact.  Mentation and speech appropriate for age.  PSYCH: Mentation appears normal, affect normal/bright, judgement and insight intact, normal speech and appearance well-groomed.                Video-Visit Details    Video Start Time: 3:32 PM    Type of service:  Video Visit    Video End Time:3:52 PM    Originating Location (pt. Location): Home    Distant Location (provider location):  [facility]     Platform used for Video Visit:  AmWell

## 2022-09-29 ENCOUNTER — OFFICE VISIT (OUTPATIENT)
Dept: CARDIOLOGY | Facility: CLINIC | Age: 27
End: 2022-09-29
Attending: PHYSICIAN ASSISTANT
Payer: COMMERCIAL

## 2022-09-29 VITALS
BODY MASS INDEX: 23.68 KG/M2 | WEIGHT: 171 LBS | SYSTOLIC BLOOD PRESSURE: 133 MMHG | DIASTOLIC BLOOD PRESSURE: 75 MMHG | HEART RATE: 50 BPM | OXYGEN SATURATION: 100 %

## 2022-09-29 DIAGNOSIS — R55 NEAR SYNCOPE: Primary | ICD-10-CM

## 2022-09-29 DIAGNOSIS — R00.1 SINUS BRADYCARDIA: ICD-10-CM

## 2022-09-29 PROCEDURE — 99203 OFFICE O/P NEW LOW 30 MIN: CPT | Performed by: INTERNAL MEDICINE

## 2022-09-29 NOTE — PROGRESS NOTES
I am delighted to see Jarrett Lynch in consultation.The primary encounter diagnosis was Near syncope. A diagnosis of Sinus bradycardia was also pertinent to this visit.   As you know, the patient is a 27 year old  male. He   has no past medical history on file..    On this visit, the patient states that he had a near syncopal episode associated with bradycardia.  The patient denies chest pressure/discomfort, dyspnea, palpitations, syncope, orthopnea, paroxysmal nocturnal dyspnea and lower extermity edema.    The patient's cardiovascular risk factors include none.    The following portions of the patient's history were reviewed and updated as appropriate: allergies, current medications, past family history, past medical history, past social history, past surgical history, and the problem list.    PMH: The patient's past medical history includes:    History reviewed. No pertinent past medical history.   History reviewed. No pertinent surgical history.    The patient's medications as of the current encounter are:     No current outpatient medications on file.       Labs:     Admission on 09/12/2022, Discharged on 09/12/2022   Component Date Value Ref Range Status     Hold Specimen 09/12/2022 [facility]   Final     Hold Specimen 09/12/2022 [facility]   Final     Hold Specimen 09/12/2022 [facility]   Final     Hold Specimen 09/12/2022 [facility]   Final     Sodium 09/12/2022 139  136 - 145 mmol/L Final     Potassium 09/12/2022 4.6  3.4 - 5.3 mmol/L Final     Creatinine 09/12/2022 0.98  0.67 - 1.17 mg/dL Final     Urea Nitrogen 09/12/2022 21.3 (A) 6.0 - 20.0 mg/dL Final     Chloride 09/12/2022 105  98 - 107 mmol/L Final     Carbon Dioxide (CO2) 09/12/2022 27  22 - 29 mmol/L Final     Anion Gap 09/12/2022 7  7 - 15 mmol/L Final     Glucose 09/12/2022 107 (A) 70 - 99 mg/dL Final     Calcium 09/12/2022 9.1  8.6 - 10.0 mg/dL Final     Protein Total 09/12/2022 6.8  6.4 - 8.3 g/dL Final     Albumin 09/12/2022 4.1  3.5 - 5.2 g/dL Final      Bilirubin Total 09/12/2022 0.4  <=1.2 mg/dL Final     Alkaline Phosphatase 09/12/2022 62  40 - 129 U/L Final     AST 09/12/2022 20  10 - 50 U/L Final     ALT 09/12/2022 18  10 - 50 U/L Final     GFR Estimate 09/12/2022 >90  >60 mL/min/1.73m2 Final    Effective December 21, 2021 eGFRcr in adults is calculated using the 2021 CKD-EPI creatinine equation which includes age and gender (Jone et al., [place], DOI: 10.[facility]6/CKEFmv5622832)     Ventricular Rate 09/12/2022 55  BPM Final     Atrial Rate 09/12/2022 55  BPM Final     HI Interval 09/12/2022 194  ms Final     QRS Duration 09/12/2022 94  ms Final     QT 09/12/2022 456  ms Final     QTc 09/12/2022 436  ms Final     P Axis 09/12/2022 78  degrees Final     R AXIS 09/12/2022 68  degrees Final     T Axis 09/12/2022 15  degrees Final     Interpretation ECG 09/12/2022    Final                    Value:Sinus bradycardia  ST elevation, consider early repolarization  Borderline ECG  Unconfirmed report - interpretation of this ECG is computer generated - see medical record for final interpretation  Confirmed by - EMERGENCY ROOM, PHYSICIAN ([id number]),  MARY COHEN ([id number]) on 9/12/2022 11:59:40 AM       WBC Count 09/12/2022 6.0  4.0 - 11.0 10e3/uL Final     RBC Count 09/12/2022 4.37 (A) 4.40 - 5.90 10e6/uL Final     Hemoglobin 09/12/2022 13.8  13.3 - 17.7 g/dL Final     Hematocrit 09/12/2022 39.4 (A) 40.0 - 53.0 % Final     MCV 09/12/2022 90  78 - 100 fL Final     MCH 09/12/2022 31.6  26.5 - 33.0 pg Final     MCHC 09/12/2022 35.0  31.5 - 36.5 g/dL Final     RDW 09/12/2022 12.8  10.0 - 15.0 % Final     Platelet Count 09/12/2022 210  150 - 450 10e3/uL Final     % Neutrophils 09/12/2022 73  % Final     % Lymphocytes 09/12/2022 17  % Final     % Monocytes 09/12/2022 7  % Final     % Eosinophils 09/12/2022 2  % Final     % Basophils 09/12/2022 1  % Final     % Immature Granulocytes 09/12/2022 0  % Final     NRBCs per 100 WBC 09/12/2022 0  <1 /100 Final     Absolute Neutrophils  09/12/2022 4.4  1.6 - 8.3 10e3/uL Final     Absolute Lymphocytes 09/12/2022 1.0  0.8 - 5.3 10e3/uL Final     Absolute Monocytes 09/12/2022 0.4  0.0 - 1.3 10e3/uL Final     Absolute Eosinophils 09/12/2022 0.1  0.0 - 0.7 10e3/uL Final     Absolute Basophils 09/12/2022 0.0  0.0 - 0.2 10e3/uL Final     Absolute Immature Granulocytes 09/12/2022 0.0  <=0.4 10e3/uL Final     Absolute NRBCs 09/12/2022 0.0  10e3/uL Final     Magnesium 09/12/2022 1.9  1.7 - 2.3 mg/dL Final     Troponin T, High Sensitivity 09/12/2022 <6  <=22 ng/L Final     D-Dimer Quantitative 09/12/2022 <0.27  0.00 - 0.50 ug/mL FEU Final     TSH 09/12/2022 3.61  0.30 - 4.20 uIU/mL Final     Free T4 09/12/2022 1.25  0.90 - 1.70 ng/dL Final       Allergies:  No Known Allergies    Family History:   Family History   Problem Relation Age of Onset     No Known Problems Mother      Hypothyroidism Father      No Known Problems Brother        Psychosocial history:  reports that he has never smoked. He has never used smokeless tobacco. He reports current alcohol use. He reports that he does not use drugs.    Review of systems: negative for, exertional chest pain or pressure, paroxysmal nocturnal dyspnea, dyspnea on exertion, orthopnea, lower extremity edema, claudication and exercise intolerance    In addition,   General: No change in weight, sleep or appetite.  Normal energy.  No fever or chills  Eyes: Negative for vision changes or eye problems  ENT: No problems with ears, nose or throat.  No difficulty swallowing.  Resp: No coughing, wheezing or shortness of breath  GI: No nausea, vomiting,  heartburn, abdominal pain, diarrhea, constipation or change in bowel habits  : No urinary frequency or dysuria, bladder or kidney problems  Musculoskeletal: No significant muscle or joint pains  Neurologic: No headaches, numbness, tingling, weakness, problems with balance or coordination  Psychiatric: No problems with anxiety, depression or mental health  Heme/immune/allergy:  No history of bleeding or clotting problems or anemia.    Endocrine: No history of thyroid disease, diabetes or other endocrine disorders  Skin: No rashes,worrisome lesions or skin problems  Vascular:  No claudication, lifestyle limiting or otherwise; no ischemic rest pain; no non-healing ulcers. No weakness, No loss of sensation        Physical examination  Vitals: /75 (BP Location: Left arm, Patient Position: Chair, Cuff Size: Adult Regular)   Pulse 50   Wt 77.6 kg (171 lb)   SpO2 100%   BMI 23.68 kg/m    BMI= Body mass index is 23.68 kg/m .    In general, the patient is a pleasant male in no apparent distress.    HEENT: Normiocephalic and atraumatic.  PERRLA.  EOMI.  Sclerae white, not injected.  Nares clear.  Pharynx without erythema or exudate.  Dentition intact.    Neck: No adenopathy.  No thyromegaly. Carotids +2/2 bilaterally without bruits.  No jugular venous distension.   Heart:  The PMI is in the 5th ICS in the midclavicular line. There is no heave. Regular rate and rhythm. Normal S1, S2 splits physiologically. No murmur, rub, click, or gallop.    Lungs: Clear to asculation.  No ronchi, wheezes, rales.  No dullness to percussion.   Abdomen: Soft, nontender, nondistended. No organomegaly. No AAA.  No bruits.   Extremities: No clubbing, cyanosis, or edema. The pulses were intact bilaterally.   Neurological: The neurological examination reveal a patient who was oriented to person, place, and time.  The remainder of the examination was nonfocal.    Cardiac tests include:    ziopatch - min HR in 30s, occ PACs, PVCs, no tachycardia, no heart blocks, symptoms associated with sinus rhythm    Assessment and Plan    1. Near syncope - plan for echo  - remain hydrated while running  - no need for pacing now  - low basal HR likely athlete's heart    The patient is to return  prn. The patient understood the treatment plan as outlined above.  There were no barriers to learning.      Landen Verdugo MD

## 2022-09-29 NOTE — LETTER
9/29/2022      RE: Jarrett Lynch  [address]  [address]  [facility] [address]       Dear Colleague,    Thank you for the opportunity to participate in the care of your patient, Jarrett Lynch, at the [facility]. Please see a copy of my visit note below.    I am delighted to see Jarrett Lynch in consultation.The primary encounter diagnosis was Near syncope. A diagnosis of Sinus bradycardia was also pertinent to this visit.   As you know, the patient is a 27 year old  male. He   has no past medical history on file..    On this visit, the patient states that he had a near syncopal episode associated with bradycardia.  The patient denies chest pressure/discomfort, dyspnea, palpitations, syncope, orthopnea, paroxysmal nocturnal dyspnea and lower extermity edema.    The patient's cardiovascular risk factors include none.    The following portions of the patient's history were reviewed and updated as appropriate: allergies, current medications, past family history, past medical history, past social history, past surgical history, and the problem list.    PMH: The patient's past medical history includes:    History reviewed. No pertinent past medical history.   History reviewed. No pertinent surgical history.    The patient's medications as of the current encounter are:     No current outpatient medications on file.       Labs:     Admission on 09/12/2022, Discharged on 09/12/2022   Component Date Value Ref Range Status     Hold Specimen 09/12/2022 JI   Final     Hold Specimen 09/12/2022 JIC   Final     Hold Specimen 09/12/2022 [facility]   Final     Hold Specimen 09/12/2022 [facility]   Final     Sodium 09/12/2022 139  136 - 145 mmol/L Final     Potassium 09/12/2022 4.6  3.4 - 5.3 mmol/L Final     Creatinine 09/12/2022 0.98  0.67 - 1.17 mg/dL Final     Urea Nitrogen 09/12/2022 21.3 (A) 6.0 - 20.0 mg/dL Final     Chloride 09/12/2022  105  98 - 107 mmol/L Final     Carbon Dioxide (CO2) 09/12/2022 27  22 - 29 mmol/L Final     Anion Gap 09/12/2022 7  7 - 15 mmol/L Final     Glucose 09/12/2022 107 (A) 70 - 99 mg/dL Final     Calcium 09/12/2022 9.1  8.6 - 10.0 mg/dL Final     Protein Total 09/12/2022 6.8  6.4 - 8.3 g/dL Final     Albumin 09/12/2022 4.1  3.5 - 5.2 g/dL Final     Bilirubin Total 09/12/2022 0.4  <=1.2 mg/dL Final     Alkaline Phosphatase 09/12/2022 62  40 - 129 U/L Final     AST 09/12/2022 20  10 - 50 U/L Final     ALT 09/12/2022 18  10 - 50 U/L Final     GFR Estimate 09/12/2022 >90  >60 mL/min/1.73m2 Final    Effective December 21, 2021 eGFRcr in adults is calculated using the 2021 CKD-EPI creatinine equation which includes age and gender (Jone et al., NEJ, DOI: 10.[facility]6/OQOMqw7875474)     Ventricular Rate 09/12/2022 55  BPM Final     Atrial Rate 09/12/2022 55  BPM Final     [place] Interval 09/12/2022 194  ms Final     QRS Duration 09/12/2022 94  ms Final     QT 09/12/2022 456  ms Final     QTc 09/12/2022 436  ms Final     P Axis 09/12/2022 78  degrees Final     R AXIS 09/12/2022 68  degrees Final     T Axis 09/12/2022 15  degrees Final     Interpretation ECG 09/12/2022    Final                    Value:Sinus bradycardia  ST elevation, consider early repolarization  Borderline ECG  Unconfirmed report - interpretation of this ECG is computer generated - see medical record for final interpretation  Confirmed by - EMERGENCY ROOM, PHYSICIAN ([id number]),  MARY COHEN ([id number]) on 9/12/2022 11:59:40 AM       WBC Count 09/12/2022 6.0  4.0 - 11.0 10e3/uL Final     RBC Count 09/12/2022 4.37 (A) 4.40 - 5.90 10e6/uL Final     Hemoglobin 09/12/2022 13.8  13.3 - 17.7 g/dL Final     Hematocrit 09/12/2022 39.4 (A) 40.0 - 53.0 % Final     MCV 09/12/2022 90  78 - 100 fL Final     MCH 09/12/2022 31.6  26.5 - 33.0 pg Final     MCHC 09/12/2022 35.0  31.5 - 36.5 g/dL Final     RDW 09/12/2022 12.8  10.0 - 15.0 % Final     Platelet Count 09/12/2022 210  150  - 450 10e3/uL Final     % Neutrophils 09/12/2022 73  % Final     % Lymphocytes 09/12/2022 17  % Final     % Monocytes 09/12/2022 7  % Final     % Eosinophils 09/12/2022 2  % Final     % Basophils 09/12/2022 1  % Final     % Immature Granulocytes 09/12/2022 0  % Final     NRBCs per 100 WBC 09/12/2022 0  <1 /100 Final     Absolute Neutrophils 09/12/2022 4.4  1.6 - 8.3 10e3/uL Final     Absolute Lymphocytes 09/12/2022 1.0  0.8 - 5.3 10e3/uL Final     Absolute Monocytes 09/12/2022 0.4  0.0 - 1.3 10e3/uL Final     Absolute Eosinophils 09/12/2022 0.1  0.0 - 0.7 10e3/uL Final     Absolute Basophils 09/12/2022 0.0  0.0 - 0.2 10e3/uL Final     Absolute Immature Granulocytes 09/12/2022 0.0  <=0.4 10e3/uL Final     Absolute NRBCs 09/12/2022 0.0  10e3/uL Final     Magnesium 09/12/2022 1.9  1.7 - 2.3 mg/dL Final     Troponin T, High Sensitivity 09/12/2022 <6  <=22 ng/L Final     D-Dimer Quantitative 09/12/2022 <0.27  0.00 - 0.50 ug/mL FEU Final     TSH 09/12/2022 3.61  0.30 - 4.20 uIU/mL Final     Free T4 09/12/2022 1.25  0.90 - 1.70 ng/dL Final       Allergies:  No Known Allergies    Family History:   Family History   Problem Relation Age of Onset     No Known Problems Mother      Hypothyroidism Father      No Known Problems Brother        Psychosocial history:  reports that he has never smoked. He has never used smokeless tobacco. He reports current alcohol use. He reports that he does not use drugs.    Review of systems: negative for, exertional chest pain or pressure, paroxysmal nocturnal dyspnea, dyspnea on exertion, orthopnea, lower extremity edema, claudication and exercise intolerance    In addition,   General: No change in weight, sleep or appetite.  Normal energy.  No fever or chills  Eyes: Negative for vision changes or eye problems  ENT: No problems with ears, nose or throat.  No difficulty swallowing.  Resp: No coughing, wheezing or shortness of breath  GI: No nausea, vomiting,  heartburn, abdominal pain, diarrhea,  constipation or change in bowel habits  : No urinary frequency or dysuria, bladder or kidney problems  Musculoskeletal: No significant muscle or joint pains  Neurologic: No headaches, numbness, tingling, weakness, problems with balance or coordination  Psychiatric: No problems with anxiety, depression or mental health  Heme/immune/allergy: No history of bleeding or clotting problems or anemia.    Endocrine: No history of thyroid disease, diabetes or other endocrine disorders  Skin: No rashes,worrisome lesions or skin problems  Vascular:  No claudication, lifestyle limiting or otherwise; no ischemic rest pain; no non-healing ulcers. No weakness, No loss of sensation        Physical examination  Vitals: /75 (BP Location: Left arm, Patient Position: Chair, Cuff Size: Adult Regular)   Pulse 50   Wt 77.6 kg (171 lb)   SpO2 100%   BMI 23.68 kg/m    BMI= Body mass index is 23.68 kg/m .    In general, the patient is a pleasant male in no apparent distress.    HEENT: Normiocephalic and atraumatic.  PERRLA.  EOMI.  Sclerae white, not injected.  Nares clear.  Pharynx without erythema or exudate.  Dentition intact.    Neck: No adenopathy.  No thyromegaly. Carotids +2/2 bilaterally without bruits.  No jugular venous distension.   Heart:  The PMI is in the 5th ICS in the midclavicular line. There is no heave. Regular rate and rhythm. Normal S1, S2 splits physiologically. No murmur, rub, click, or gallop.    Lungs: Clear to asculation.  No ronchi, wheezes, rales.  No dullness to percussion.   Abdomen: Soft, nontender, nondistended. No organomegaly. No AAA.  No bruits.   Extremities: No clubbing, cyanosis, or edema. The pulses were intact bilaterally.   Neurological: The neurological examination reveal a patient who was oriented to person, place, and time.  The remainder of the examination was nonfocal.    Cardiac tests include:    ziopatch - min HR in 30s, occ PACs, PVCs, no tachycardia, no heart blocks, symptoms  associated with sinus rhythm    Assessment and Plan    1. Near syncope - plan for echo  - remain hydrated while running  - no need for pacing now  - low basal HR likely athlete's heart    The patient is to return  prn. The patient understood the treatment plan as outlined above.  There were no barriers to learning.      Landen Verdugo MD

## 2022-09-29 NOTE — NURSING NOTE
"Chief Complaint   Patient presents with     Bradycardia      NEW Dr. Verdugo referal placed due to presyncopal event and sinus bradycardia. Pt is a runner, [employer] labs neg. Justino in process.        Initial /75 (BP Location: Left arm, Patient Position: Chair, Cuff Size: Adult Regular)   Pulse 50   Wt 77.6 kg (171 lb)   SpO2 100%   BMI 23.68 kg/m   Estimated body mass index is 23.68 kg/m  as calculated from the following:    Height as of 1/13/22: 1.81 m (5' 11.26\").    Weight as of this encounter: 77.6 kg (171 lb)..  BP completed using cuff size: regular    JUNITO Puckett  "

## 2022-09-29 NOTE — PATIENT INSTRUCTIONS
Thank you for coming to the [facility]; please note the following instructions:    1. Echocardiogram    2. Follow up with cardiology as needed.        If you have any questions regarding your visit please contact your care team:     Cardiology  Telephone Number   Tiffany LYMAN, RN  Lamar GARDNER, RN   Zainab RAYMOND, JUNITO ALMEIDA, JUNITO DURAN, Visit Facilitator   [phone] (option 1)   For scheduling appts:     [phone] (select option 1)       For the Device Clinic (Pacemakers and ICD's)  RN's :  Alyx Carrasco   During business hours: [phone]    *After business hours:  [phone] (select option 4)      Normal test result notifications will be released via [employer] or mailed within 7 business days.  All other test results, will be communicated via telephone once reviewed by your cardiologist.    If you need a medication refill please contact your pharmacy.  Please allow 3 business days for your refill to be completed.    As always, thank you for trusting us with your health care needs!

## 2022-10-03 ENCOUNTER — HEALTH MAINTENANCE LETTER (OUTPATIENT)
Age: 27
End: 2022-10-03

## 2022-10-06 ENCOUNTER — ANCILLARY PROCEDURE (OUTPATIENT)
Dept: CARDIOLOGY | Facility: CLINIC | Age: 27
End: 2022-10-06
Attending: INTERNAL MEDICINE
Payer: COMMERCIAL

## 2022-10-06 DIAGNOSIS — R00.1 SINUS BRADYCARDIA: ICD-10-CM

## 2022-10-06 DIAGNOSIS — R55 NEAR SYNCOPE: ICD-10-CM

## 2022-10-06 LAB — LVEF ECHO: NORMAL

## 2022-10-06 PROCEDURE — 93306 TTE W/DOPPLER COMPLETE: CPT | Performed by: INTERNAL MEDICINE

## 2023-02-11 ENCOUNTER — HEALTH MAINTENANCE LETTER (OUTPATIENT)
Age: 28
End: 2023-02-11

## 2023-03-16 ENCOUNTER — E-VISIT (OUTPATIENT)
Dept: URGENT CARE | Facility: CLINIC | Age: 28
End: 2023-03-16
Payer: COMMERCIAL

## 2023-03-16 DIAGNOSIS — R30.0 DYSURIA: Primary | ICD-10-CM

## 2023-03-16 PROCEDURE — 99207 PR NON-BILLABLE SERV PER CHARTING: CPT | Performed by: NURSE PRACTITIONER

## 2023-03-17 ENCOUNTER — OFFICE VISIT (OUTPATIENT)
Dept: URGENT CARE | Facility: URGENT CARE | Age: 28
End: 2023-03-17
Payer: COMMERCIAL

## 2023-03-17 VITALS
DIASTOLIC BLOOD PRESSURE: 66 MMHG | RESPIRATION RATE: 16 BRPM | TEMPERATURE: 98.1 F | WEIGHT: 170 LBS | SYSTOLIC BLOOD PRESSURE: 122 MMHG | OXYGEN SATURATION: 97 % | BODY MASS INDEX: 23.54 KG/M2 | HEART RATE: 55 BPM

## 2023-03-17 DIAGNOSIS — R30.0 DYSURIA: Primary | ICD-10-CM

## 2023-03-17 LAB
ALBUMIN UR-MCNC: NEGATIVE MG/DL
APPEARANCE UR: CLEAR
BILIRUB UR QL STRIP: NEGATIVE
COLOR UR AUTO: YELLOW
GLUCOSE UR STRIP-MCNC: NEGATIVE MG/DL
HGB UR QL STRIP: NEGATIVE
KETONES UR STRIP-MCNC: NEGATIVE MG/DL
LEUKOCYTE ESTERASE UR QL STRIP: NEGATIVE
NITRATE UR QL: NEGATIVE
PH UR STRIP: 5.5 [PH] (ref 5–7)
SP GR UR STRIP: <=1.005 (ref 1–1.03)
UROBILINOGEN UR STRIP-ACNC: 0.2 E.U./DL

## 2023-03-17 PROCEDURE — 81003 URINALYSIS AUTO W/O SCOPE: CPT

## 2023-03-17 PROCEDURE — 99213 OFFICE O/P EST LOW 20 MIN: CPT | Performed by: PHYSICIAN ASSISTANT

## 2023-03-17 RX ORDER — CEFDINIR 300 MG/1
300 CAPSULE ORAL 2 TIMES DAILY
Qty: 10 CAPSULE | Refills: 0 | Status: SHIPPED | OUTPATIENT
Start: 2023-03-17 | End: 2023-03-22

## 2023-03-17 NOTE — PATIENT INSTRUCTIONS
Dear Jarrett Lynch,    We are sorry you are not feeling well. Based on the responses you provided, it is recommended that you be seen in-person in urgent care so we can better evaluate your symptoms. Please click here to find the nearest urgent care location to you.   You will not be charged for this Visit. Thank you for trusting us with your care.    Joycelyn Arnett, CNP

## 2023-03-17 NOTE — PROGRESS NOTES
SUBJECTIVE:  Jarrett Lynch is a 27 year old male who comes in with concern for UTI.  Patient states that for approximately 1 week he has had frequency of urination along with some pain and pressure and urgency.  States that he urinates and then feels like its not fully empty.  He states that his stream is normal.  He has not had any penile discharge.  He is circumcised.  He has no concerns for STDs.  No blood has been noted.  Denies any nausea, vomiting, abdominal pain or blood in his urine.  He does have a history of UTI x1 in the past.  He is otherwise at baseline health    No past medical history on file.  Patient Active Problem List   Diagnosis     Sinus bradycardia     Near syncope     No current outpatient medications on file.     No current facility-administered medications for this visit.     Social History     Socioeconomic History     Marital status: Single     Spouse name: Not on file     Number of children: 0     Years of education: Not on file     Highest education level: Not on file   Occupational History     Not on file   Tobacco Use     Smoking status: Never     Smokeless tobacco: Never   Vaping Use     Vaping Use: Never used   Substance and Sexual Activity     Alcohol use: Yes     Comment: rare     Drug use: Never     Sexual activity: Not Currently   Other Topics Concern     Not on file   Social History Narrative     Not on file     Social Determinants of Health     Financial Resource Strain: Not on file   Food Insecurity: Not on file   Transportation Needs: Not on file   Physical Activity: Not on file   Stress: Not on file   Social Connections: Not on file   Intimate Partner Violence: Not on file   Housing Stability: Not on file     ROS  Negative other than stated above    Exam:  GENERAL APPEARANCE: healthy, alert and no distress  EYES: EOMI,  PERRL  RESP: lungs clear to auscultation - no rales, rhonchi or wheezes  CV: regular rates and rhythm, normal S1 S2, no S3 or S4 and no murmur, click or  rub -  ABDOMEN:  soft, nontender, no HSM or masses and bowel sounds normal.  No CVA tenderness noted  GU_male: Patient declines exam    Results for orders placed or performed in visit on 03/17/23   UA macro with reflex to Microscopic and Culture - Clinc Collect     Status: Normal    Specimen: Urine, Clean Catch   Result Value Ref Range    Color Urine Yellow Colorless, Straw, Light Yellow, Yellow    Appearance Urine Clear Clear    Glucose Urine Negative Negative mg/dL    Bilirubin Urine Negative Negative    Ketones Urine Negative Negative mg/dL    Specific Gravity Urine <=1.005 1.003 - 1.035    Blood Urine Negative Negative    pH Urine 5.5 5.0 - 7.0    Protein Albumin Urine Negative Negative mg/dL    Urobilinogen Urine 0.2 0.2, 1.0 E.U./dL    Nitrite Urine Negative Negative    Leukocyte Esterase Urine Negative Negative    Narrative    Microscopic not indicated     assessment/plan:  (R30.0) Dysuria  (primary encounter diagnosis)  Comment:   Plan: UA macro with reflex to Microscopic and Culture        - Clinc Collect          Patient with 1 week history of UTI related symptoms.  He does have a history of UTIs.  There is no evidence for stone, pyelonephritis and no concerns for STDs.  Most likely a mild urethritis.  Discussed increasing fluids at this time.  Due to history and length of symptoms will cover with [employer].  Continue to monitor symptoms and if worsen advised to follow-up with primary.

## 2024-03-09 ENCOUNTER — HEALTH MAINTENANCE LETTER (OUTPATIENT)
Age: 29
End: 2024-03-09

## 2024-07-23 SDOH — HEALTH STABILITY: PHYSICAL HEALTH: ON AVERAGE, HOW MANY DAYS PER WEEK DO YOU ENGAGE IN MODERATE TO STRENUOUS EXERCISE (LIKE A BRISK WALK)?: 6 DAYS

## 2024-07-23 SDOH — HEALTH STABILITY: PHYSICAL HEALTH: ON AVERAGE, HOW MANY MINUTES DO YOU ENGAGE IN EXERCISE AT THIS LEVEL?: 50 MIN

## 2024-07-23 ASSESSMENT — SOCIAL DETERMINANTS OF HEALTH (SDOH): HOW OFTEN DO YOU GET TOGETHER WITH FRIENDS OR RELATIVES?: TWICE A WEEK

## 2024-07-25 ENCOUNTER — OFFICE VISIT (OUTPATIENT)
Dept: FAMILY MEDICINE | Facility: CLINIC | Age: 29
End: 2024-07-25
Payer: COMMERCIAL

## 2024-07-25 VITALS
DIASTOLIC BLOOD PRESSURE: 74 MMHG | BODY MASS INDEX: 24.03 KG/M2 | HEART RATE: 61 BPM | OXYGEN SATURATION: 99 % | RESPIRATION RATE: 18 BRPM | TEMPERATURE: 98.4 F | SYSTOLIC BLOOD PRESSURE: 137 MMHG | WEIGHT: 177.4 LBS | HEIGHT: 72 IN

## 2024-07-25 DIAGNOSIS — E78.5 HYPERLIPIDEMIA LDL GOAL <100: ICD-10-CM

## 2024-07-25 DIAGNOSIS — Z11.4 SCREENING FOR HIV (HUMAN IMMUNODEFICIENCY VIRUS): Primary | ICD-10-CM

## 2024-07-25 DIAGNOSIS — Z11.3 SCREENING FOR GONORRHEA: ICD-10-CM

## 2024-07-25 LAB
ALBUMIN SERPL BCG-MCNC: 4.3 G/DL (ref 3.5–5.2)
ALP SERPL-CCNC: 76 U/L (ref 40–150)
ALT SERPL W P-5'-P-CCNC: 19 U/L (ref 0–70)
ANION GAP SERPL CALCULATED.3IONS-SCNC: 9 MMOL/L (ref 7–15)
AST SERPL W P-5'-P-CCNC: 20 U/L (ref 0–45)
BILIRUB SERPL-MCNC: 0.4 MG/DL
BUN SERPL-MCNC: 18.1 MG/DL (ref 6–20)
CALCIUM SERPL-MCNC: 9.2 MG/DL (ref 8.8–10.4)
CHLORIDE SERPL-SCNC: 104 MMOL/L (ref 98–107)
CHOLEST SERPL-MCNC: 125 MG/DL
CREAT SERPL-MCNC: 1 MG/DL (ref 0.67–1.17)
EGFRCR SERPLBLD CKD-EPI 2021: >90 ML/MIN/1.73M2
FASTING STATUS PATIENT QL REPORTED: YES
FASTING STATUS PATIENT QL REPORTED: YES
GLUCOSE SERPL-MCNC: 94 MG/DL (ref 70–99)
HCO3 SERPL-SCNC: 27 MMOL/L (ref 22–29)
HDLC SERPL-MCNC: 59 MG/DL
HIV 1+2 AB+HIV1 P24 AG SERPL QL IA: NONREACTIVE
LDLC SERPL CALC-MCNC: 57 MG/DL
NONHDLC SERPL-MCNC: 66 MG/DL
POTASSIUM SERPL-SCNC: 4.7 MMOL/L (ref 3.4–5.3)
PROT SERPL-MCNC: 7 G/DL (ref 6.4–8.3)
SODIUM SERPL-SCNC: 140 MMOL/L (ref 135–145)
TRIGL SERPL-MCNC: 43 MG/DL

## 2024-07-25 PROCEDURE — 99395 PREV VISIT EST AGE 18-39: CPT | Performed by: INTERNAL MEDICINE

## 2024-07-25 PROCEDURE — 87389 HIV-1 AG W/HIV-1&-2 AB AG IA: CPT | Performed by: INTERNAL MEDICINE

## 2024-07-25 PROCEDURE — 87491 CHLMYD TRACH DNA AMP PROBE: CPT | Performed by: INTERNAL MEDICINE

## 2024-07-25 PROCEDURE — 80061 LIPID PANEL: CPT | Performed by: INTERNAL MEDICINE

## 2024-07-25 PROCEDURE — 87591 N.GONORRHOEAE DNA AMP PROB: CPT | Performed by: INTERNAL MEDICINE

## 2024-07-25 PROCEDURE — 80053 COMPREHEN METABOLIC PANEL: CPT | Performed by: INTERNAL MEDICINE

## 2024-07-25 PROCEDURE — 36415 COLL VENOUS BLD VENIPUNCTURE: CPT | Performed by: INTERNAL MEDICINE

## 2024-07-25 NOTE — PROGRESS NOTES
Preventive Care Visit  [facility] ILA Khan MD, Internal Medicine - Pediatrics  Jul 25, 2024      Assessment & Plan   Problem List Items Addressed This Visit    None  Visit Diagnoses       Screening for HIV (human immunodeficiency virus)    -  Primary    Relevant Orders    HIV Antigen Antibody Combo (Completed)    Screening for gonorrhea        Relevant Orders    REVIEW OF HEALTH MAINTENANCE PROTOCOL ORDERS (Completed)    NEISSERIA GONORRHOEA PCR (Completed)    CHLAMYDIA TRACHOMATIS PCR (Completed)    Hyperlipidemia LDL goal <100        Relevant Orders    Comprehensive metabolic panel (BMP + Alb, Alk Phos, ALT, AST, Total. Bili, TP) (Completed)    Lipid panel reflex to direct LDL Fasting (Completed)                    Counseling  Appropriate preventive services were addressed with this patient via screening, questionnaire, or discussion as appropriate for fall prevention, nutrition, physical activity, Tobacco-use cessation, weight loss and cognition.  Checklist reviewing preventive services available has been given to the patient.  Reviewed patient's diet, addressing concerns and/or questions.     Work on weight loss  Regular exercise      Kel Farias is a 29 year old, presenting for the following:  Physical        7/25/2024     8:00 AM   Additional Questions   Roomed by Arren        Health Care Directive  Patient does not have a Health Care Directive or Living Will: Discussed advance care planning with patient; information given to patient to review.    HPI  [employer] marathathon training   Not full maratha  [facility]               7/23/2024   General Health   How would you rate your overall physical health? Excellent   Feel stress (tense, anxious, or unable to sleep) Not at all            7/23/2024   Nutrition   Three or more servings of calcium each day? Yes   Diet: Regular (no restrictions)   How many servings of fruit and vegetables per day? (!) 2-3    How many sweetened beverages each day? 0-1            7/23/2024   Exercise   Days per week of moderate/strenous exercise 6 days   Average minutes spent exercising at this level 50 min            7/23/2024   Social Factors   Frequency of gathering with friends or relatives Twice a week   Worry food won't last until get money to buy more No   Food not last or not have enough money for food? No   Do you have housing? (Housing is defined as stable permanent housing and does not include staying ouside in a car, in a tent, in an abandoned building, in an overnight shelter, or couch-surfing.) Yes   Are you worried about losing your housing? No   Lack of transportation? No   Unable to get utilities (heat,electricity)? No            7/23/2024   Dental   Dentist two times every year? Yes            7/23/2024   TB Screening   Were you born outside of the [place]? No            Today's PHQ-2 Score:       7/24/2024     1:26 PM   PHQ-2 ( 1999 [employer])   Q1: Little interest or pleasure in doing things 0   Q2: Feeling down, depressed or hopeless 0   PHQ-2 Score 0   Q1: Little interest or pleasure in doing things Not at all   Q2: Feeling down, depressed or hopeless Not at all   PHQ-2 Score 0           7/23/2024   Substance Use   Alcohol more than 3/day or more than 7/wk Not Applicable   Do you use any other substances recreationally? No        Social History     Tobacco Use    Smoking status: Never    Smokeless tobacco: Never   Vaping Use    Vaping status: Never Used   Substance Use Topics    Alcohol use: Not Currently     Comment: rare    Drug use: Never             7/23/2024   One time HIV Screening   Previous HIV test? No          7/23/2024   STI Screening   New sexual partner(s) since last STI/HIV test? No            7/23/2024   Contraception/Family Planning   Questions about contraception or family planning No           Reviewed and updated as needed this visit by Provider                    Lab work is in process  Labs reviewed in  "[facility]  BP Readings from Last 3 Encounters:   07/25/24 137/74   03/17/23 122/66   09/29/22 133/75    Wt Readings from Last 3 Encounters:   07/25/24 80.5 kg (177 lb 6.4 oz)   03/17/23 77.1 kg (170 lb)   09/29/22 77.6 kg (171 lb)                  Patient Active Problem List   Diagnosis    Sinus bradycardia    Near syncope     No past surgical history on file.    Social History     Tobacco Use    Smoking status: Never    Smokeless tobacco: Never   Substance Use Topics    Alcohol use: Not Currently     Comment: rare     Family History   Problem Relation Age of Onset    No Known Problems Mother     Hypothyroidism Father     No Known Problems Brother          No current outpatient medications on file.     No Known Allergies      Review of Systems  Constitutional, HEENT, cardiovascular, pulmonary, gi and gu systems are negative, except as otherwise noted.     Objective    Exam  /74 (BP Location: Left arm, Patient Position: Sitting, Cuff Size: Adult Regular)   Pulse 61   Temp 98.4  F (36.9  C) (Temporal)   Resp 18   Ht 1.816 m (5' 11.5\")   Wt 80.5 kg (177 lb 6.4 oz)   SpO2 99%   BMI 24.40 kg/m     Estimated body mass index is 24.4 kg/m  as calculated from the following:    Height as of this encounter: 1.816 m (5' 11.5\").    Weight as of this encounter: 80.5 kg (177 lb 6.4 oz).    Physical Exam  GENERAL: alert and no distress  EYES: Eyes grossly normal to inspection, PERRL and conjunctivae and sclerae normal  HENT: ear canals and TM's normal, nose and mouth without ulcers or lesions  NECK: no adenopathy, no asymmetry, masses, or scars  RESP: lungs clear to auscultation - no rales, rhonchi or wheezes  CV: regular rate and rhythm, normal S1 S2, no S3 or S4, no murmur, click or rub, no peripheral edema  ABDOMEN: soft, nontender, no hepatosplenomegaly, no masses and bowel sounds normal  MS: no gross musculoskeletal defects noted, no edema  SKIN: no suspicious lesions or rashes  NEURO: Normal strength and tone, " mentation intact and speech normal  Comprehensive back pain exam:  normal  LYMPH: no cervical, supraclavicular, axillary, or inguinal adenopathy        Signed Electronically by: Eric Khan MD

## 2024-07-26 LAB
C TRACH DNA SPEC QL NAA+PROBE: NEGATIVE
N GONORRHOEA DNA SPEC QL NAA+PROBE: NEGATIVE

## 2024-08-02 ENCOUNTER — TRANSFERRED RECORDS (OUTPATIENT)
Dept: HEALTH INFORMATION MANAGEMENT | Facility: CLINIC | Age: 29
End: 2024-08-02
Payer: COMMERCIAL

## 2025-05-11 ENCOUNTER — OFFICE VISIT (OUTPATIENT)
Dept: URGENT CARE | Facility: URGENT CARE | Age: 30
End: 2025-05-11
Payer: COMMERCIAL

## 2025-05-11 VITALS
HEART RATE: 61 BPM | SYSTOLIC BLOOD PRESSURE: 122 MMHG | WEIGHT: 173.25 LBS | TEMPERATURE: 97.9 F | RESPIRATION RATE: 20 BRPM | DIASTOLIC BLOOD PRESSURE: 66 MMHG | BODY MASS INDEX: 23.83 KG/M2 | OXYGEN SATURATION: 100 %

## 2025-05-11 DIAGNOSIS — S61.213A LACERATION OF LEFT MIDDLE FINGER WITHOUT FOREIGN BODY WITHOUT DAMAGE TO NAIL, INITIAL ENCOUNTER: Primary | ICD-10-CM

## 2025-05-11 PROCEDURE — 3078F DIAST BP <80 MM HG: CPT | Performed by: FAMILY MEDICINE

## 2025-05-11 PROCEDURE — 3074F SYST BP LT 130 MM HG: CPT | Performed by: FAMILY MEDICINE

## 2025-05-11 PROCEDURE — 99212 OFFICE O/P EST SF 10 MIN: CPT | Performed by: FAMILY MEDICINE

## 2025-05-11 NOTE — PROGRESS NOTES
Jarrett Lynch is a 29 year old male who comes in today for cut finger about 1 1/2 hours ago    Cutting potatoes with     Otherwise healthy      at [employer]    Left middle finger    Full physical not done     Mentation and affect are fine    No tremor of speech or extremity    Patient has small roughly 8x3 mm bevel cut on distal phalynx of left middle finger    On the flexor pad aspect    Nothing to sew/ suture    Oozing blood but not as much as initially per patient    Clean wound    Sensation and strength and range of motion intact throughout      ASSESSMENT / PLAN:  (I70.392M) Laceration of left middle finger without foreign body without damage to nail, initial encounter  (primary encounter diagnosis)  Comment: we applied pressure bandage/ dressing and gave patient a few supplies so he can replace this as needed.  Should heal up okay.   Plan: follow up prn symptoms.    Be seen promptly if symptoms acutely worsen.      I reviewed the patient's medications, allergies, medical history, family history, and social history.    Farhat Rehman MD

## 2025-05-11 NOTE — PATIENT INSTRUCTIONS
Apply pressure dressings tonight and tomorrow as needed    Then antibiotic ointment and typical bandaid as needed    Call/ return to clinic with problems

## 2025-06-30 ENCOUNTER — PATIENT OUTREACH (OUTPATIENT)
Dept: CARE COORDINATION | Facility: CLINIC | Age: 30
End: 2025-06-30
Payer: COMMERCIAL

## 2025-07-14 ENCOUNTER — PATIENT OUTREACH (OUTPATIENT)
Dept: CARE COORDINATION | Facility: CLINIC | Age: 30
End: 2025-07-14
Payer: COMMERCIAL

## 2025-08-31 ENCOUNTER — HEALTH MAINTENANCE LETTER (OUTPATIENT)
Age: 30
End: 2025-08-31